# Patient Record
Sex: FEMALE | Race: WHITE | Employment: FULL TIME | URBAN - METROPOLITAN AREA
[De-identification: names, ages, dates, MRNs, and addresses within clinical notes are randomized per-mention and may not be internally consistent; named-entity substitution may affect disease eponyms.]

---

## 2017-01-04 PROBLEM — E66.9 OBESITY (BMI 30-39.9): Status: ACTIVE | Noted: 2017-01-04

## 2017-01-10 PROBLEM — E66.9 OBESITY (BMI 30-39.9): Status: ACTIVE | Noted: 2017-01-10

## 2017-02-01 ENCOUNTER — OFFICE VISIT (OUTPATIENT)
Dept: UROLOGY | Facility: HOSPITAL | Age: 57
End: 2017-02-01
Attending: OBSTETRICS & GYNECOLOGY
Payer: COMMERCIAL

## 2017-02-01 VITALS
BODY MASS INDEX: 30.05 KG/M2 | WEIGHT: 176 LBS | DIASTOLIC BLOOD PRESSURE: 70 MMHG | SYSTOLIC BLOOD PRESSURE: 140 MMHG | HEIGHT: 64 IN

## 2017-02-01 DIAGNOSIS — R39.15 URGENCY OF URINATION: ICD-10-CM

## 2017-02-01 DIAGNOSIS — Z98.890 POST-OPERATIVE STATE: Primary | ICD-10-CM

## 2017-02-01 LAB
BLOOD URINE: NEGATIVE
CONTROL RUN WITHIN 24 HOURS?: YES
NITRITE URINE: NEGATIVE

## 2017-02-01 PROCEDURE — 99211 OFF/OP EST MAY X REQ PHY/QHP: CPT

## 2017-02-01 PROCEDURE — 81002 URINALYSIS NONAUTO W/O SCOPE: CPT

## 2017-02-08 PROBLEM — G47.33 OSA ON CPAP: Status: ACTIVE | Noted: 2017-02-08

## 2017-02-08 PROBLEM — Z99.89 OSA ON CPAP: Status: ACTIVE | Noted: 2017-02-08

## 2017-02-08 PROBLEM — J44.9 CHRONIC OBSTRUCTIVE PULMONARY DISEASE, UNSPECIFIED COPD TYPE (HCC): Status: ACTIVE | Noted: 2017-02-08

## 2017-06-09 PROBLEM — Z95.810 ICD (IMPLANTABLE CARDIOVERTER-DEFIBRILLATOR) IN PLACE: Status: ACTIVE | Noted: 2017-06-09

## 2017-06-28 ENCOUNTER — OFFICE VISIT (OUTPATIENT)
Dept: UROLOGY | Facility: HOSPITAL | Age: 57
End: 2017-06-28
Attending: OBSTETRICS & GYNECOLOGY
Payer: COMMERCIAL

## 2017-06-28 VITALS
BODY MASS INDEX: 27.31 KG/M2 | SYSTOLIC BLOOD PRESSURE: 110 MMHG | WEIGHT: 160 LBS | DIASTOLIC BLOOD PRESSURE: 64 MMHG | HEIGHT: 64 IN

## 2017-06-28 DIAGNOSIS — Z98.890 POST-OPERATIVE STATE: Primary | ICD-10-CM

## 2017-06-28 DIAGNOSIS — R35.1 NOCTURIA: ICD-10-CM

## 2017-06-28 DIAGNOSIS — N39.41 URGE INCONTINENCE: ICD-10-CM

## 2017-06-28 DIAGNOSIS — R39.15 URGENCY OF URINATION: ICD-10-CM

## 2017-06-28 PROCEDURE — 99211 OFF/OP EST MAY X REQ PHY/QHP: CPT

## 2017-06-28 RX ORDER — ESTRADIOL 0.1 MG/G
CREAM VAGINAL
Qty: 1 TUBE | Refills: 3 | Status: SHIPPED | OUTPATIENT
Start: 2017-06-28 | End: 2018-08-13

## 2017-06-28 NOTE — PATIENT INSTRUCTIONS
[facility]    PELVIC MUSCLE EXERCISES [facility])    The muscles that surround the vagina help to support the pelvic organs and maintain bladder and bowel control are called the “pelvic floor muscles”.   Exercises for these musc effort in an active squeeze in order to strengthen the muscles. It is better to maintain a strong active squeeze for a short period of time that to flick the muscle on and off for any period of time.   You will need to work up to a full 10-second squeeze g

## 2017-07-06 PROBLEM — M16.11 PRIMARY OSTEOARTHRITIS OF RIGHT HIP: Status: ACTIVE | Noted: 2017-07-06

## 2017-08-03 PROCEDURE — 86803 HEPATITIS C AB TEST: CPT | Performed by: FAMILY MEDICINE

## 2017-10-18 ENCOUNTER — HOSPITAL ENCOUNTER (OUTPATIENT)
Facility: HOSPITAL | Age: 57
Setting detail: HOSPITAL OUTPATIENT SURGERY
Discharge: HOME | End: 2017-10-18
Attending: INTERNAL MEDICINE | Admitting: INTERNAL MEDICINE
Payer: COMMERCIAL

## 2017-10-18 ENCOUNTER — SURGERY (OUTPATIENT)
Age: 57
End: 2017-10-18

## 2017-10-18 VITALS
RESPIRATION RATE: 16 BRPM | HEIGHT: 64 IN | WEIGHT: 160 LBS | SYSTOLIC BLOOD PRESSURE: 115 MMHG | BODY MASS INDEX: 27.31 KG/M2 | DIASTOLIC BLOOD PRESSURE: 61 MMHG | TEMPERATURE: 98 F | HEART RATE: 52 BPM | OXYGEN SATURATION: 99 %

## 2017-10-18 DIAGNOSIS — Z12.11 SPECIAL SCREENING FOR MALIGNANT NEOPLASMS, COLON: ICD-10-CM

## 2017-10-18 PROCEDURE — 0DJD8ZZ INSPECTION OF LOWER INTESTINAL TRACT, VIA NATURAL OR ARTIFICIAL OPENING ENDOSCOPIC: ICD-10-PCS | Performed by: INTERNAL MEDICINE

## 2017-10-18 RX ORDER — ONDANSETRON 2 MG/ML
4 INJECTION INTRAMUSCULAR; INTRAVENOUS ONCE AS NEEDED
Status: DISCONTINUED | OUTPATIENT
Start: 2017-10-18 | End: 2017-10-18

## 2017-10-18 RX ORDER — MIDAZOLAM HYDROCHLORIDE 1 MG/ML
INJECTION INTRAMUSCULAR; INTRAVENOUS
Status: DISCONTINUED | OUTPATIENT
Start: 2017-10-18 | End: 2017-10-18

## 2017-10-18 RX ORDER — SODIUM CHLORIDE, SODIUM LACTATE, POTASSIUM CHLORIDE, CALCIUM CHLORIDE 600; 310; 30; 20 MG/100ML; MG/100ML; MG/100ML; MG/100ML
INJECTION, SOLUTION INTRAVENOUS CONTINUOUS
Status: DISCONTINUED | OUTPATIENT
Start: 2017-10-18 | End: 2017-10-18

## 2017-10-18 NOTE — H&P
[facility] GASTROENTEROLOGY    REFERRING PHYSICIAN: Dr. Narcisa Paniagua is a 49year old female. CRC screening    See note reviewed from 9/7/17    PROCEDURE: Colonoscopy    Allergies: Levaquin; Aminoglycosides; Ciprofloxacin;  Ot Howard Mike DO;  Location:  [address] MANAGEMENT  7/3/2014: INJECTION, W/WO CONTRAST, DX/THERAPEUTIC SUBST* N/A      Comment: Procedure: LUMBAR EPIDURAL;  Surgeon: Althea Domínguez DO;  Location:

## 2017-10-18 NOTE — OPERATIVE REPORT
[address] [place] [address] Patient Status:  Hospital Outpatient Surgery    11/3/1960 MRN [id number]   [facility] ENDOSCOPY Attending Joel Collado MD   Hosp Day # 0 PCP Mary Ba MD         PATIENT NAME: Lara Cids

## 2017-10-25 ENCOUNTER — OFFICE VISIT (OUTPATIENT)
Dept: OBGYN CLINIC | Facility: CLINIC | Age: 57
End: 2017-10-25

## 2017-10-25 VITALS
DIASTOLIC BLOOD PRESSURE: 60 MMHG | WEIGHT: 165 LBS | BODY MASS INDEX: 29.98 KG/M2 | HEIGHT: 62.25 IN | SYSTOLIC BLOOD PRESSURE: 114 MMHG | HEART RATE: 64 BPM

## 2017-10-25 DIAGNOSIS — Z01.419 ENCOUNTER FOR GYNECOLOGICAL EXAMINATION WITHOUT ABNORMAL FINDING: Primary | ICD-10-CM

## 2017-10-25 DIAGNOSIS — N64.4 BREAST PAIN, LEFT: ICD-10-CM

## 2017-10-25 PROCEDURE — 99396 PREV VISIT EST AGE 40-64: CPT | Performed by: OBSTETRICS & GYNECOLOGY

## 2017-10-25 NOTE — PROGRESS NOTES
Patient ID:   Anthony Aragon  is a 49year old female         HPI:     Here for general gyn exam. Last seen 2016. New medical/surgical hx:  Diagnosed with sleep apnea on testing last year - has CPAP device.  Developed severe hip pain af [facility])    • Depression    • Extrinsic asthma, unspecified     moderate persistent   • Fibrocystic breast disease    • Fibroids    • H/O mammogram 16    benign   • H/O pregnancy 1984       • High blood pressure    • High cholesterol    • Surgeon: Belen Lundberg DO;  Location: [address] [place] [address]                MANAGEMENT  2009: MARY BIOPSY STEREOTACTIC NODULE 2 SITE BILAT      Comment: gera  11-: SPINAL FUSION  No date: TONSILLECTOMY      Comment: child  6.25.2008: X rhythm, normal heart sounds. Pulmonary/Chest: Clear to auscultation. Breath sounds normal.  Breasts:   Symmetrical.  Soft tissue with homogeneous texture.   Diffuse tenderness to palpation of almost all of L breast. Slight thickening inferiorly where breas

## 2017-12-11 ENCOUNTER — OFFICE VISIT (OUTPATIENT)
Dept: UROLOGY | Facility: HOSPITAL | Age: 57
End: 2017-12-11
Attending: OBSTETRICS & GYNECOLOGY
Payer: COMMERCIAL

## 2017-12-11 VITALS
HEIGHT: 62.25 IN | SYSTOLIC BLOOD PRESSURE: 106 MMHG | DIASTOLIC BLOOD PRESSURE: 58 MMHG | WEIGHT: 170 LBS | BODY MASS INDEX: 30.89 KG/M2

## 2017-12-11 DIAGNOSIS — Z98.890 POST-OPERATIVE STATE: Primary | ICD-10-CM

## 2017-12-11 PROCEDURE — 99211 OFF/OP EST MAY X REQ PHY/QHP: CPT

## 2017-12-11 RX ORDER — OXYBUTYNIN CHLORIDE 10 MG/1
10 TABLET, EXTENDED RELEASE ORAL DAILY
Qty: 30 TABLET | Refills: 3 | Status: SHIPPED | OUTPATIENT
Start: 2017-12-11 | End: 2017-12-20

## 2017-12-18 ENCOUNTER — HOSPITAL ENCOUNTER (OUTPATIENT)
Dept: MAMMOGRAPHY | Facility: HOSPITAL | Age: 57
Discharge: HOME | End: 2017-12-18
Attending: OBSTETRICS & GYNECOLOGY
Payer: COMMERCIAL

## 2017-12-18 DIAGNOSIS — N64.4 BREAST PAIN, LEFT: ICD-10-CM

## 2017-12-18 PROCEDURE — 77062 BREAST TOMOSYNTHESIS BI: CPT | Performed by: OBSTETRICS & GYNECOLOGY

## 2017-12-18 PROCEDURE — 77066 DX MAMMO INCL CAD BI: CPT | Performed by: OBSTETRICS & GYNECOLOGY

## 2017-12-20 ENCOUNTER — TELEPHONE (OUTPATIENT)
Dept: UROLOGY | Facility: HOSPITAL | Age: 57
End: 2017-12-20

## 2017-12-20 NOTE — TELEPHONE ENCOUNTER
Pt called with c/o dizziness and HA while taking Ditropan, she stopped the medication over the weekend and is feeling better, she does not want to take it anymore, asking if there is another medication she can try, VORB per Dr. Arely Bai, Myrbbetriq 50 mg one t

## 2018-01-02 ENCOUNTER — TELEPHONE (OUTPATIENT)
Dept: UROLOGY | Facility: HOSPITAL | Age: 58
End: 2018-01-02

## 2018-01-02 NOTE — TELEPHONE ENCOUNTER
Pt called with bp report as requested by our office after starting Myrbetriq 2 weeks ago. /68 today. Pt reports her nocturia is improving on Myrbetriq but feels it is too early to tell.   Encouraged to continue checking BPs at least weekly and repor

## 2018-01-31 ENCOUNTER — TELEPHONE (OUTPATIENT)
Dept: UROLOGY | Facility: HOSPITAL | Age: 58
End: 2018-01-31

## 2018-01-31 NOTE — TELEPHONE ENCOUNTER
Pt phoned w/ medication update. Is currently taking mybetriq. States her b/p is fine. States she no longer has the bedwetting and is getting up once a night to empty. Pt feels she is \"at a standstill\".  Has urgency problems in the morning, but knows ricardo

## 2018-02-10 PROCEDURE — 87077 CULTURE AEROBIC IDENTIFY: CPT | Performed by: FAMILY MEDICINE

## 2018-02-10 PROCEDURE — 87186 SC STD MICRODIL/AGAR DIL: CPT | Performed by: FAMILY MEDICINE

## 2018-02-10 PROCEDURE — 87086 URINE CULTURE/COLONY COUNT: CPT | Performed by: FAMILY MEDICINE

## 2018-02-19 ENCOUNTER — TELEPHONE (OUTPATIENT)
Dept: UROLOGY | Facility: HOSPITAL | Age: 58
End: 2018-02-19

## 2018-02-19 NOTE — TELEPHONE ENCOUNTER
Patient is calling with OAB update - taking Myrbetriq 50 mg daily and had seen improvement in nocturia until last week when she had an episode of urge incontinence at [facility].  Is currently being treated for a UTI - Is using Estrace vaginal cream, but not using

## 2018-03-01 ENCOUNTER — HOSPITAL ENCOUNTER (OUTPATIENT)
Dept: CARDIOLOGY CLINIC | Facility: HOSPITAL | Age: 58
Discharge: HOME | End: 2018-03-01
Attending: INTERNAL MEDICINE

## 2018-03-01 DIAGNOSIS — Z13.9 ENCOUNTER FOR SCREENING: ICD-10-CM

## 2018-04-13 ENCOUNTER — OFFICE VISIT (OUTPATIENT)
Dept: UROLOGY | Facility: HOSPITAL | Age: 58
End: 2018-04-13
Attending: OBSTETRICS & GYNECOLOGY
Payer: COMMERCIAL

## 2018-04-13 VITALS
HEIGHT: 62 IN | BODY MASS INDEX: 32.02 KG/M2 | WEIGHT: 174 LBS | SYSTOLIC BLOOD PRESSURE: 112 MMHG | DIASTOLIC BLOOD PRESSURE: 62 MMHG

## 2018-04-13 DIAGNOSIS — N39.41 URGE INCONTINENCE: ICD-10-CM

## 2018-04-13 DIAGNOSIS — N39.44 BED WETTING: ICD-10-CM

## 2018-04-13 DIAGNOSIS — R39.15 URGENCY OF URINATION: Primary | ICD-10-CM

## 2018-04-13 DIAGNOSIS — R35.1 NOCTURIA: ICD-10-CM

## 2018-04-13 PROCEDURE — 99211 OFF/OP EST MAY X REQ PHY/QHP: CPT

## 2018-08-13 ENCOUNTER — TELEPHONE (OUTPATIENT)
Dept: UROLOGY | Facility: HOSPITAL | Age: 58
End: 2018-08-13

## 2018-08-13 RX ORDER — MIRABEGRON 50 MG/1
TABLET, FILM COATED, EXTENDED RELEASE ORAL
Qty: 30 TABLET | Refills: 5 | Status: SHIPPED | OUTPATIENT
Start: 2018-08-13 | End: 2019-02-19

## 2018-08-13 RX ORDER — ESTRADIOL 0.1 MG/G
CREAM VAGINAL
Qty: 43 G | Refills: 2 | Status: SHIPPED | OUTPATIENT
Start: 2018-08-13 | End: 2019-03-19

## 2018-08-13 NOTE — TELEPHONE ENCOUNTER
Pt called, states Mybetriq is helping and wanted to know if she could get a refill, also needs a refill on her Estrace cream, pt had an apt in April, [address] [facility] to refill both meds until April 2019 apt, Estrace cream one gram per vagina twice weekly, Romel

## 2018-12-10 ENCOUNTER — OFFICE VISIT (OUTPATIENT)
Dept: HEMATOLOGY/ONCOLOGY | Facility: HOSPITAL | Age: 58
End: 2018-12-10
Attending: THORACIC SURGERY (CARDIOTHORACIC VASCULAR SURGERY)
Payer: COMMERCIAL

## 2018-12-10 VITALS
OXYGEN SATURATION: 98 % | TEMPERATURE: 98 F | DIASTOLIC BLOOD PRESSURE: 62 MMHG | WEIGHT: 148.38 LBS | BODY MASS INDEX: 25.33 KG/M2 | SYSTOLIC BLOOD PRESSURE: 116 MMHG | HEART RATE: 60 BPM | RESPIRATION RATE: 18 BRPM | HEIGHT: 64.02 IN

## 2018-12-10 DIAGNOSIS — R91.1 LUNG NODULE: Primary | ICD-10-CM

## 2018-12-10 PROCEDURE — 99211 OFF/OP EST MAY X REQ PHY/QHP: CPT

## 2018-12-10 NOTE — CONSULTS
Thoracic Surgery H and P    Reason for Consultation: RUL lung nodule    Consulting Physician: Meena DOLL    Chief Complaint: \"lung spot\"    History of Present Illness: Patient is a 66year old, female with PMHx of COPD, asthma, HTN, and cardiomyopathy tablet, Rfl: 0  •  ATORVASTATIN 10 MG Oral Tab, TAKE 1 TABLET BY MOUTH IN THE EVENING *COMPLETE FASTING LABS SOON FOR FURTHER REFILLS, Disp: 90 tablet, Rfl: 3  •  Tiotropium Bromide Monohydrate (SPIRIVA RESPIMAT) 2.5 MCG/ACT Inhalation Aero Soln, Inhale 2 screening 6/2011    wnl   • Seizure [facility]) 2004    one seizure that was heart related - defibritaltor put in    • Sleep apnea     CPAP   • Unspecified essential hypertension    • Visual impairment     contacts/glasses       Past Surgical History:    Past Mitchell History:    Family History   Adopted: Yes   Problem Relation Age of Onset   • Allergies Daughter    • Asthma Daughter    • Asthma Son          Review of Systems:    A 10 point review of systems was conducted and was negative except that which was  listed i esophagus  4. Coronary arterial calcifications  5. Mild emphysematous changes    PET/CT 11/16/18: IMPRESSION:  Low level uptake in the groundglass attenuation right upper lobe nodule.  While this could be  inflammatory in nature, an indolent malignancy suc Once this is complete, I will look to her cardiologist for operative clearance.   I went over the alternatives (surveillance, needle biopsy with possible radiation) and the risks, including: bleeding, infection, prolonged air leak, nerve injury, MI, stroke,

## 2018-12-12 ENCOUNTER — APPOINTMENT (OUTPATIENT)
Dept: HEMATOLOGY/ONCOLOGY | Facility: HOSPITAL | Age: 58
End: 2018-12-12
Attending: THORACIC SURGERY (CARDIOTHORACIC VASCULAR SURGERY)
Payer: COMMERCIAL

## 2018-12-14 ENCOUNTER — APPOINTMENT (OUTPATIENT)
Dept: HEMATOLOGY/ONCOLOGY | Facility: HOSPITAL | Age: 58
End: 2018-12-14
Payer: COMMERCIAL

## 2018-12-17 ENCOUNTER — APPOINTMENT (OUTPATIENT)
Dept: LAB | Facility: HOSPITAL | Age: 58
End: 2018-12-17
Attending: PHYSICIAN ASSISTANT
Payer: COMMERCIAL

## 2018-12-17 ENCOUNTER — LAB ENCOUNTER (OUTPATIENT)
Dept: LAB | Age: 58
End: 2018-12-17
Attending: PHYSICIAN ASSISTANT
Payer: COMMERCIAL

## 2018-12-17 DIAGNOSIS — R91.1 LUNG NODULE: ICD-10-CM

## 2018-12-17 PROCEDURE — 80048 BASIC METABOLIC PNL TOTAL CA: CPT

## 2018-12-17 PROCEDURE — 86900 BLOOD TYPING SEROLOGIC ABO: CPT

## 2018-12-17 PROCEDURE — 93005 ELECTROCARDIOGRAM TRACING: CPT

## 2018-12-17 PROCEDURE — 86901 BLOOD TYPING SEROLOGIC RH(D): CPT

## 2018-12-17 PROCEDURE — 86850 RBC ANTIBODY SCREEN: CPT

## 2018-12-17 PROCEDURE — 36415 COLL VENOUS BLD VENIPUNCTURE: CPT

## 2018-12-17 PROCEDURE — 93010 ELECTROCARDIOGRAM REPORT: CPT | Performed by: INTERNAL MEDICINE

## 2018-12-17 PROCEDURE — 85025 COMPLETE CBC W/AUTO DIFF WBC: CPT

## 2019-01-17 ENCOUNTER — ANESTHESIA (OUTPATIENT)
Dept: CARDIAC SURGERY | Facility: HOSPITAL | Age: 59
End: 2019-01-17

## 2019-01-17 ENCOUNTER — HOSPITAL ENCOUNTER (INPATIENT)
Facility: HOSPITAL | Age: 59
LOS: 2 days | Discharge: HOME | DRG: 164 | End: 2019-01-19
Attending: THORACIC SURGERY (CARDIOTHORACIC VASCULAR SURGERY) | Admitting: THORACIC SURGERY (CARDIOTHORACIC VASCULAR SURGERY)
Payer: COMMERCIAL

## 2019-01-17 ENCOUNTER — ANESTHESIA EVENT (OUTPATIENT)
Dept: CARDIAC SURGERY | Facility: HOSPITAL | Age: 59
End: 2019-01-17

## 2019-01-17 LAB
ANTIBODY SCREEN: NEGATIVE
GLUCOSE BLD-MCNC: 100 MG/DL (ref 70–99)
ISTAT ACTIVATED CLOTTING TIME: 136 SECONDS (ref 74–137)
ISTAT BLOOD GAS BASE DEFICIT: -1 MMOL/L
ISTAT BLOOD GAS HCO3: 24.4 MEQ/L (ref 22–26)
ISTAT BLOOD GAS O2 SATURATION: 100 % (ref 92–100)
ISTAT BLOOD GAS PCO2: 40.1 MMHG (ref 35–45)
ISTAT BLOOD GAS PH: 7.39 (ref 7.35–7.45)
ISTAT BLOOD GAS PO2: 291 MMHG (ref 80–105)
ISTAT BLOOD GAS TCO2: 26 MMOL/L (ref 22–32)
ISTAT HEMATOCRIT: 37 % (ref 34–50)
ISTAT IONIZED CALCIUM: 1.13 MMOL/L (ref 1.12–1.32)
ISTAT POTASSIUM: 3.5 MMOL/L (ref 3.6–5.1)
ISTAT SODIUM: 143 MMOL/L (ref 136–144)
RH BLOOD TYPE: POSITIVE

## 2019-01-17 PROCEDURE — 86920 COMPATIBILITY TEST SPIN: CPT

## 2019-01-17 PROCEDURE — 88321 CONSLTJ&REPRT SLD PREP ELSWR: CPT | Performed by: THORACIC SURGERY (CARDIOTHORACIC VASCULAR SURGERY)

## 2019-01-17 PROCEDURE — 0BBC4ZZ EXCISION OF RIGHT UPPER LUNG LOBE, PERCUTANEOUS ENDOSCOPIC APPROACH: ICD-10-PCS | Performed by: THORACIC SURGERY (CARDIOTHORACIC VASCULAR SURGERY)

## 2019-01-17 PROCEDURE — 88307 TISSUE EXAM BY PATHOLOGIST: CPT | Performed by: THORACIC SURGERY (CARDIOTHORACIC VASCULAR SURGERY)

## 2019-01-17 PROCEDURE — 84295 ASSAY OF SERUM SODIUM: CPT

## 2019-01-17 PROCEDURE — 85014 HEMATOCRIT: CPT

## 2019-01-17 PROCEDURE — 86850 RBC ANTIBODY SCREEN: CPT | Performed by: THORACIC SURGERY (CARDIOTHORACIC VASCULAR SURGERY)

## 2019-01-17 PROCEDURE — 84132 ASSAY OF SERUM POTASSIUM: CPT

## 2019-01-17 PROCEDURE — 4B02XTZ MEASUREMENT OF CARDIAC DEFIBRILLATOR, EXTERNAL APPROACH: ICD-10-PCS | Performed by: HOSPITALIST

## 2019-01-17 PROCEDURE — 86900 BLOOD TYPING SEROLOGIC ABO: CPT | Performed by: THORACIC SURGERY (CARDIOTHORACIC VASCULAR SURGERY)

## 2019-01-17 PROCEDURE — 88331 PATH CONSLTJ SURG 1 BLK 1SPC: CPT | Performed by: THORACIC SURGERY (CARDIOTHORACIC VASCULAR SURGERY)

## 2019-01-17 PROCEDURE — 94660 CPAP INITIATION&MGMT: CPT

## 2019-01-17 PROCEDURE — 07B74ZZ EXCISION OF THORAX LYMPHATIC, PERCUTANEOUS ENDOSCOPIC APPROACH: ICD-10-PCS | Performed by: THORACIC SURGERY (CARDIOTHORACIC VASCULAR SURGERY)

## 2019-01-17 PROCEDURE — 82803 BLOOD GASES ANY COMBINATION: CPT

## 2019-01-17 PROCEDURE — 82330 ASSAY OF CALCIUM: CPT

## 2019-01-17 PROCEDURE — 86901 BLOOD TYPING SEROLOGIC RH(D): CPT | Performed by: THORACIC SURGERY (CARDIOTHORACIC VASCULAR SURGERY)

## 2019-01-17 PROCEDURE — 88342 IMHCHEM/IMCYTCHM 1ST ANTB: CPT

## 2019-01-17 PROCEDURE — 88305 TISSUE EXAM BY PATHOLOGIST: CPT | Performed by: THORACIC SURGERY (CARDIOTHORACIC VASCULAR SURGERY)

## 2019-01-17 PROCEDURE — 88341 IMHCHEM/IMCYTCHM EA ADD ANTB: CPT

## 2019-01-17 PROCEDURE — 88313 SPECIAL STAINS GROUP 2: CPT

## 2019-01-17 PROCEDURE — 85347 COAGULATION TIME ACTIVATED: CPT

## 2019-01-17 RX ORDER — IPRATROPIUM BROMIDE AND ALBUTEROL SULFATE 2.5; .5 MG/3ML; MG/3ML
3 SOLUTION RESPIRATORY (INHALATION) EVERY 6 HOURS PRN
Status: DISCONTINUED | OUTPATIENT
Start: 2019-01-17 | End: 2019-01-19

## 2019-01-17 RX ORDER — ALBUTEROL SULFATE 2.5 MG/3ML
2.5 SOLUTION RESPIRATORY (INHALATION) AS NEEDED
Status: DISCONTINUED | OUTPATIENT
Start: 2019-01-17 | End: 2019-01-17 | Stop reason: HOSPADM

## 2019-01-17 RX ORDER — ALBUTEROL SULFATE 2.5 MG/3ML
SOLUTION RESPIRATORY (INHALATION)
Status: COMPLETED
Start: 2019-01-17 | End: 2019-01-17

## 2019-01-17 RX ORDER — DOCUSATE SODIUM 100 MG/1
100 CAPSULE, LIQUID FILLED ORAL 2 TIMES DAILY
Status: DISCONTINUED | OUTPATIENT
Start: 2019-01-17 | End: 2019-01-19

## 2019-01-17 RX ORDER — MORPHINE SULFATE 2 MG/ML
6 INJECTION, SOLUTION INTRAMUSCULAR; INTRAVENOUS EVERY 4 HOURS PRN
Status: DISCONTINUED | OUTPATIENT
Start: 2019-01-17 | End: 2019-01-19

## 2019-01-17 RX ORDER — KETOROLAC TROMETHAMINE 15 MG/ML
15 INJECTION, SOLUTION INTRAMUSCULAR; INTRAVENOUS EVERY 6 HOURS
Status: DISCONTINUED | OUTPATIENT
Start: 2019-01-17 | End: 2019-01-19

## 2019-01-17 RX ORDER — HYDROMORPHONE HYDROCHLORIDE 1 MG/ML
0.4 INJECTION, SOLUTION INTRAMUSCULAR; INTRAVENOUS; SUBCUTANEOUS EVERY 5 MIN PRN
Status: DISCONTINUED | OUTPATIENT
Start: 2019-01-17 | End: 2019-01-17 | Stop reason: HOSPADM

## 2019-01-17 RX ORDER — RAMIPRIL 5 MG/1
5 CAPSULE ORAL NIGHTLY
Status: DISCONTINUED | OUTPATIENT
Start: 2019-01-17 | End: 2019-01-19

## 2019-01-17 RX ORDER — SODIUM CHLORIDE, SODIUM LACTATE, POTASSIUM CHLORIDE, CALCIUM CHLORIDE 600; 310; 30; 20 MG/100ML; MG/100ML; MG/100ML; MG/100ML
INJECTION, SOLUTION INTRAVENOUS CONTINUOUS
Status: DISCONTINUED | OUTPATIENT
Start: 2019-01-17 | End: 2019-01-17 | Stop reason: HOSPADM

## 2019-01-17 RX ORDER — ATORVASTATIN CALCIUM 10 MG/1
10 TABLET, FILM COATED ORAL NIGHTLY
Status: DISCONTINUED | OUTPATIENT
Start: 2019-01-17 | End: 2019-01-19

## 2019-01-17 RX ORDER — SODIUM CHLORIDE 0.9 % (FLUSH) 0.9 %
10 SYRINGE (ML) INJECTION AS NEEDED
Status: DISCONTINUED | OUTPATIENT
Start: 2019-01-17 | End: 2019-01-19

## 2019-01-17 RX ORDER — METOCLOPRAMIDE HYDROCHLORIDE 5 MG/ML
10 INJECTION INTRAMUSCULAR; INTRAVENOUS AS NEEDED
Status: DISCONTINUED | OUTPATIENT
Start: 2019-01-17 | End: 2019-01-17 | Stop reason: HOSPADM

## 2019-01-17 RX ORDER — HYDROMORPHONE HYDROCHLORIDE 1 MG/ML
INJECTION, SOLUTION INTRAMUSCULAR; INTRAVENOUS; SUBCUTANEOUS
Status: COMPLETED
Start: 2019-01-17 | End: 2019-01-17

## 2019-01-17 RX ORDER — BISACODYL 10 MG
10 SUPPOSITORY, RECTAL RECTAL
Status: DISCONTINUED | OUTPATIENT
Start: 2019-01-17 | End: 2019-01-19

## 2019-01-17 RX ORDER — MONTELUKAST SODIUM 10 MG/1
10 TABLET ORAL NIGHTLY
Status: DISCONTINUED | OUTPATIENT
Start: 2019-01-17 | End: 2019-01-19

## 2019-01-17 RX ORDER — CEFAZOLIN SODIUM/WATER 2 G/20 ML
2 SYRINGE (ML) INTRAVENOUS EVERY 8 HOURS
Status: COMPLETED | OUTPATIENT
Start: 2019-01-17 | End: 2019-01-18

## 2019-01-17 RX ORDER — ACETAMINOPHEN 10 MG/ML
1000 INJECTION, SOLUTION INTRAVENOUS EVERY 6 HOURS
Status: COMPLETED | OUTPATIENT
Start: 2019-01-17 | End: 2019-01-19

## 2019-01-17 RX ORDER — ONDANSETRON 2 MG/ML
4 INJECTION INTRAMUSCULAR; INTRAVENOUS AS NEEDED
Status: DISCONTINUED | OUTPATIENT
Start: 2019-01-17 | End: 2019-01-17 | Stop reason: HOSPADM

## 2019-01-17 RX ORDER — ONDANSETRON 2 MG/ML
4 INJECTION INTRAMUSCULAR; INTRAVENOUS EVERY 6 HOURS PRN
Status: DISCONTINUED | OUTPATIENT
Start: 2019-01-17 | End: 2019-01-19

## 2019-01-17 RX ORDER — OXYCODONE HYDROCHLORIDE 5 MG/1
5 TABLET ORAL EVERY 4 HOURS PRN
Qty: 40 TABLET | Refills: 0 | Status: SHIPPED | OUTPATIENT
Start: 2019-01-17 | End: 2019-01-25 | Stop reason: SINTOL

## 2019-01-17 RX ORDER — BUPIVACAINE HYDROCHLORIDE 2.5 MG/ML
INJECTION, SOLUTION EPIDURAL; INFILTRATION; INTRACAUDAL AS NEEDED
Status: DISCONTINUED | OUTPATIENT
Start: 2019-01-17 | End: 2019-01-17

## 2019-01-17 RX ORDER — CALCIUM CARBONATE 200(500)MG
1000 TABLET,CHEWABLE ORAL 3 TIMES DAILY PRN
Status: DISCONTINUED | OUTPATIENT
Start: 2019-01-17 | End: 2019-01-19

## 2019-01-17 RX ORDER — ONDANSETRON 2 MG/ML
INJECTION INTRAMUSCULAR; INTRAVENOUS
Status: COMPLETED
Start: 2019-01-17 | End: 2019-01-17

## 2019-01-17 RX ORDER — ESCITALOPRAM OXALATE 10 MG/1
10 TABLET ORAL NIGHTLY
Status: DISCONTINUED | OUTPATIENT
Start: 2019-01-17 | End: 2019-01-19

## 2019-01-17 RX ORDER — KETOROLAC TROMETHAMINE 15 MG/ML
30 INJECTION, SOLUTION INTRAMUSCULAR; INTRAVENOUS ONCE
Status: DISCONTINUED | OUTPATIENT
Start: 2019-01-17 | End: 2019-01-19

## 2019-01-17 RX ORDER — MEPERIDINE HYDROCHLORIDE 25 MG/ML
12.5 INJECTION INTRAMUSCULAR; INTRAVENOUS; SUBCUTANEOUS AS NEEDED
Status: DISCONTINUED | OUTPATIENT
Start: 2019-01-17 | End: 2019-01-17 | Stop reason: HOSPADM

## 2019-01-17 RX ORDER — METOPROLOL SUCCINATE 100 MG/1
200 TABLET, EXTENDED RELEASE ORAL NIGHTLY
Status: DISCONTINUED | OUTPATIENT
Start: 2019-01-17 | End: 2019-01-19

## 2019-01-17 RX ORDER — NALOXONE HYDROCHLORIDE 0.4 MG/ML
80 INJECTION, SOLUTION INTRAMUSCULAR; INTRAVENOUS; SUBCUTANEOUS AS NEEDED
Status: DISCONTINUED | OUTPATIENT
Start: 2019-01-17 | End: 2019-01-17 | Stop reason: HOSPADM

## 2019-01-17 RX ORDER — BUPROPION HYDROCHLORIDE 150 MG/1
150 TABLET, EXTENDED RELEASE ORAL 2 TIMES DAILY
Status: DISCONTINUED | OUTPATIENT
Start: 2019-01-17 | End: 2019-01-19

## 2019-01-17 RX ORDER — POLYETHYLENE GLYCOL 3350 17 G/17G
17 POWDER, FOR SOLUTION ORAL DAILY PRN
Status: DISCONTINUED | OUTPATIENT
Start: 2019-01-17 | End: 2019-01-19

## 2019-01-17 RX ORDER — SODIUM CHLORIDE 9 MG/ML
INJECTION, SOLUTION INTRAVENOUS CONTINUOUS
Status: DISCONTINUED | OUTPATIENT
Start: 2019-01-17 | End: 2019-01-18

## 2019-01-17 RX ORDER — MORPHINE SULFATE 2 MG/ML
4 INJECTION, SOLUTION INTRAMUSCULAR; INTRAVENOUS EVERY 4 HOURS PRN
Status: DISCONTINUED | OUTPATIENT
Start: 2019-01-17 | End: 2019-01-19

## 2019-01-17 RX ORDER — MORPHINE SULFATE 2 MG/ML
2 INJECTION, SOLUTION INTRAMUSCULAR; INTRAVENOUS EVERY 4 HOURS PRN
Status: DISCONTINUED | OUTPATIENT
Start: 2019-01-17 | End: 2019-01-19

## 2019-01-17 RX ORDER — CEFAZOLIN SODIUM/WATER 2 G/20 ML
SYRINGE (ML) INTRAVENOUS
Status: DISCONTINUED | OUTPATIENT
Start: 2019-01-17 | End: 2019-01-17

## 2019-01-17 RX ORDER — HEPARIN SODIUM 5000 [USP'U]/ML
5000 INJECTION, SOLUTION INTRAVENOUS; SUBCUTANEOUS EVERY 8 HOURS SCHEDULED
Status: DISCONTINUED | OUTPATIENT
Start: 2019-01-17 | End: 2019-01-19

## 2019-01-17 RX ORDER — OXYCODONE HYDROCHLORIDE 5 MG/1
5 TABLET ORAL EVERY 4 HOURS PRN
Status: DISCONTINUED | OUTPATIENT
Start: 2019-01-17 | End: 2019-01-19

## 2019-01-17 RX ORDER — SODIUM PHOSPHATE, DIBASIC AND SODIUM PHOSPHATE, MONOBASIC 7; 19 G/133ML; G/133ML
1 ENEMA RECTAL ONCE AS NEEDED
Status: DISCONTINUED | OUTPATIENT
Start: 2019-01-17 | End: 2019-01-19

## 2019-01-17 RX ORDER — OXYCODONE HYDROCHLORIDE 10 MG/1
10 TABLET ORAL EVERY 4 HOURS PRN
Status: DISCONTINUED | OUTPATIENT
Start: 2019-01-17 | End: 2019-01-19

## 2019-01-17 NOTE — ANESTHESIA PREPROCEDURE EVALUATION
PRE-OP EVALUATION    Patient Name: Sherie Silveira    Pre-op Diagnosis: lung nodule    Procedure(s):  flexible bronchoscopy, right video assisted upper lobe wedge resection, lymph node dissection,possible lobectomy, possible thoracotomy  Elisa MATHEW    Surg LABS SOON FOR FURTHER REFILLS Disp: 90 tablet Rfl: 3   Metoprolol Succinate  MG Oral Tablet 24 Hr Take 1 tablet (200 mg total) by mouth once daily.  Disp: 90 tablet Rfl: 3   ALBUTEROL SULFATE (2.5 MG/3ML) 0.083% Inhalation Nebu Soln USE 1 AMPULE IN NE (chronic obstructive pulmonary disease) (HCC) Visual impairment  Osteoarthritis History of suburethral sling procedure  Alcoholic cardiomyopathy ([address] Seizure disorder ([address]    2D Echo  Findings    Left ventricle:  The cavity size is normal. Wall thickness CARDIAC DEFIBRILLATOR PLACEMENT  2011    Medtronic single chamber ICD   • CARDIAC PACEMAKER PLACEMENT     • COLONOSCOPY  10/18/17= Diverticulosis    Repeat 2022   • COLONOSCOPY N/A 10/18/2017    Performed by Thais Lara MD at [address] [place] [address],[address] 12/17/2018    GLU 97 12/17/2018    CA 9.0 12/17/2018            Airway      Mallampati: III  Mouth opening: 3 FB  TM distance: 4 - 6 cm  Neck ROM: full Cardiovascular      Rhythm: regular  Rate: normal     Dental    No notable dental history.          Pulmo

## 2019-01-17 NOTE — BRIEF OP NOTE
Pre-Operative Diagnosis: lung nodule     Post-Operative Diagnosis: lung nodule       Procedure Performed:   Procedure(s):  Flexible bronchoscopy, right video assisted upper lobe wedge resection, lymph node dissection.     Surgeon(s) and Role:     Pino Manley

## 2019-01-17 NOTE — PROGRESS NOTES
Admit this am for thoracic surgery with Dr. Karla Gaspar, family at bedside, all questions answered, no changes in health since last visit. BB called and needs a new t&s. will collect urine studies asap. monitor for s/s.

## 2019-01-17 NOTE — PLAN OF CARE
Assumed care @ 1200. A&Ox4.  1L O2 sats 95%. NSR. Ch draining normal volumes. 6/10 pain controlled with IV morphine at this time. Chest tube at low suction with dressing in tact. Plan of care discussed with patient. Call light within reach.   Will c

## 2019-01-17 NOTE — ANESTHESIA POSTPROCEDURE EVALUATION
[facility] Patient Status:  Inpatient   Age/Gender 66year old female MRN [id number]   Location [facility] Attending Maicol Gutierrez, Maite Vaughn MD   [facility] Day [facility] PCP Blair Mays MD       Anesthesia Post-op

## 2019-01-17 NOTE — H&P
Thoracic Surgery H and P    Reason for Consultation: RUL nodule    Consulting Physician: Meena DOLL    Chief Complaint: \" lung spot.  \"    History of Present Illness: Patient is a 66year old, female 66year old, female with PMHx of COPD, asthma, HTN, depressive disorder 4/3/2013   • Osteoarthritis     right hip bone spurs   • Osteopenia    • Other and unspecified hyperlipidemia    • Pap smear for cervical cancer screening 6/2011    wnl   • Seizure ([address] 2004    one seizure that was heart related - defi quittin.3      Smokeless tobacco: Never Used      Tobacco comment: smokes e cigarette now, quiting/at least 30 pack hx per md note    Alcohol use: No      Alcohol/week: 0.0 oz      Family History:    Family History   Adopted: Yes   Problem Relation Age recommended  2. Additional small nodular densities in both lungs as described  3. Small hiatal hernia and/or wall thickening of the distal esophagus  4. Coronary arterial calcifications  5. Mild emphysematous changes     PET/CT 11/16/18:   IMPRESSION:  Low

## 2019-01-17 NOTE — CONSULTS
General Medicine Consult      Reason for consult: medical management    Consulted by: Dr. Esperanza Reza    PCP: Elisha Richey MD      History of Present Illness: Patient is a 66year old female with mmp including but not limited to cardiomyopathy with ICD, HTN N/A 10/18/2017    Performed by Kori Mueller MD at [facility] ENDOSCOPY   • COLPOSCOPY, CERVIX W/UPPER ADJACENT VAGINA; W/BIOPSY(S), CERVIX  1985   • CRYOCAUTERY OF CERVIX  1985    CECE   • HYSTERECTOMY  6/26/2012    erin S & O   • INTRAOPERATIVE NEURO MONITORING Apply 1 gram vaginally 2-3 times per week.  Disp: 43 g Rfl: 2   [DISCONTINUED] MONTELUKAST SODIUM 10 MG Oral Tab TAKE 1 TABLET BY MOUTH IN THE EVENING  Disp: 30 tablet Rfl: 5   TraMADol HCl 50 MG Oral Tab Take 1 tablet (50 mg total) by mouth every 6 (six) h hayfever, horses, cats-nasal symptoms, itchy             eyes     Soc Hx:  Social History    Tobacco Use      Smoking status: Former Smoker        Packs/day: 1.00        Years: 35.00        Pack years: 35        Types: Cigarettes        Quit date: 9/21/201 pain  -IV morphine prn, oxy prn-encourage transition to oral  -antiemetics, bowel regimen    ** pulmonary nodule s/p RUL wedge resection with LN dissection  -prelim path- benign    **cardiomyopathy with ICD-no acute issues, continue home meds    **HTN-stab

## 2019-01-17 NOTE — CONSULTS
Pulmonary Consult     Assessment / Plan:  1. Pulmonary nodule s/p right VATS wedge resection  - wean O2 as able  - IS  - chest tube management per thoracic surgery  - follow-up final pathology; prelim path with benign findings  2.  INDRA  - on auto-pap 9-16 Unspecified essential hypertension    • Visual impairment     contacts/glasses       Past Surgical History:   Procedure Laterality Date   • BLADDER TRANSVAGINAL TAPING SUSPENSION / URETHROLYSIS N/A 12/9/2016    Performed by Rosie Eli MD at [place] [address]. 90 capsule Rfl: 1 1/16/2019 at 2030   Fluticasone-Umeclidin-Vilant (TRELEGY ELLIPTA) 100-62.5-25 MCG/INH Inhalation Aerosol Powder, Breath Activated Inhale 1 puff into the lungs daily.  Disp: 3 each Rfl: 3 1/16/2019 at 0900   NAPROXEN 500 MG Oral Tab TAKE 1 Oral Tab TAKE 1 TABLET BY MOUTH TWO TIMES A DAY with meals Disp: 60 tablet Rfl: 0   MYRBETRIQ 50 MG Oral Tablet 24 Hr TAKE 1 TABLET BY MOUTH ONE TIME A DAY  Disp: 30 tablet Rfl: 5   Estradiol (ESTRACE) 0.1 MG/GM Vaginal Cream Apply 1 gram vaginally 2-3 vamsi quittin.3      Smokeless tobacco: Never Used      Tobacco comment: smokes e cigarette now, quiting/at least 30 pack hx per md note    Substance and Sexual Activity      Alcohol use: No        Alcohol/week: 0.0 oz      Drug use: No      Sexual activity:

## 2019-01-18 LAB
ANION GAP SERPL CALC-SCNC: 9 MMOL/L (ref 0–18)
BASOPHILS # BLD AUTO: 0.01 X10(3) UL (ref 0–0.1)
BASOPHILS NFR BLD AUTO: 0.1 %
BUN BLD-MCNC: 7 MG/DL (ref 8–20)
BUN/CREAT SERPL: 11.5 (ref 10–20)
CALCIUM BLD-MCNC: 8.2 MG/DL (ref 8.3–10.3)
CHLORIDE SERPL-SCNC: 110 MMOL/L (ref 101–111)
CO2 SERPL-SCNC: 23 MMOL/L (ref 22–32)
CREAT BLD-MCNC: 0.61 MG/DL (ref 0.55–1.02)
EOSINOPHIL # BLD AUTO: 0 X10(3) UL (ref 0–0.3)
EOSINOPHIL NFR BLD AUTO: 0 %
ERYTHROCYTE [DISTWIDTH] IN BLOOD BY AUTOMATED COUNT: 13.8 % (ref 11.5–16)
GLUCOSE BLD-MCNC: 94 MG/DL (ref 70–99)
HAV IGM SER QL: 2.2 MG/DL (ref 1.8–2.5)
HCT VFR BLD AUTO: 34.2 % (ref 34–50)
HGB BLD-MCNC: 11.3 G/DL (ref 12–16)
IMMATURE GRANULOCYTE COUNT: 0.08 X10(3) UL (ref 0–1)
IMMATURE GRANULOCYTE RATIO %: 0.6 %
LYMPHOCYTES # BLD AUTO: 1.56 X10(3) UL (ref 0.9–4)
LYMPHOCYTES NFR BLD AUTO: 11.2 %
MCH RBC QN AUTO: 30.2 PG (ref 27–33.2)
MCHC RBC AUTO-ENTMCNC: 33 G/DL (ref 31–37)
MCV RBC AUTO: 91.4 FL (ref 81–100)
MONOCYTES # BLD AUTO: 1.34 X10(3) UL (ref 0.1–1)
MONOCYTES NFR BLD AUTO: 9.6 %
NEUTROPHIL ABS PRELIM: 10.91 X10 (3) UL (ref 1.3–6.7)
NEUTROPHILS # BLD AUTO: 10.91 X10(3) UL (ref 1.3–6.7)
NEUTROPHILS NFR BLD AUTO: 78.5 %
OSMOLALITY SERPL CALC.SUM OF ELEC: 292 MOSM/KG (ref 275–295)
PLATELET # BLD AUTO: 295 10(3)UL (ref 150–450)
POTASSIUM SERPL-SCNC: 3.9 MMOL/L (ref 3.6–5.1)
RBC # BLD AUTO: 3.74 X10(6)UL (ref 3.8–5.1)
RED CELL DISTRIBUTION WIDTH-SD: 46.1 FL (ref 35.1–46.3)
SODIUM SERPL-SCNC: 142 MMOL/L (ref 136–144)
WBC # BLD AUTO: 13.9 X10(3) UL (ref 4–13)

## 2019-01-18 PROCEDURE — 80048 BASIC METABOLIC PNL TOTAL CA: CPT | Performed by: HOSPITALIST

## 2019-01-18 PROCEDURE — 85025 COMPLETE CBC W/AUTO DIFF WBC: CPT | Performed by: HOSPITALIST

## 2019-01-18 PROCEDURE — 83735 ASSAY OF MAGNESIUM: CPT | Performed by: HOSPITALIST

## 2019-01-18 NOTE — PROGRESS NOTES
THORACIC SURGERY POST-OPERATIVE PROGRESS NOTE    Subjective:    Doing well. Complains only of pain. Objective:     01/18/19  0823   BP: 100/80   Pulse: 76   Resp: 20   Temp: 98.3 °F (36.8 °C)       I/O last 3 completed shifts: In: 2348 [P.O.:720;  I.V.:

## 2019-01-18 NOTE — PLAN OF CARE
Patient alert and oriented times four. Meds given per MAR. Pain managed with scheduled meds. Chest tube with bloody drainage, dressing is c/d/i. Ch with clear, yellow, urine. Vital signs stable. Resting comfortably in bed. Call light in reach.     GASTRO

## 2019-01-18 NOTE — PROGRESS NOTES
[address] [place] [address] Patient Status:  Inpatient    11/3/1960 MRN [id number]   [facility] 7NE-A Attending Patricia Arita., Shy Aguirre MD   Hosp Day # 1 PCP Norvel Lefort, MD     Pulm / Critical Care Progress Note     S: feels ok.  So respiratory distress. Head: Normocephalic, without obvious abnormality, atraumatic. Lungs: ctab   Chest wall: postop changes. Ct on R   Heart: Regular rate and rhythm, normal S1S2, no murmur. Abdomen: soft, non-tender, non-distended, positive BS.    E

## 2019-01-18 NOTE — PROGRESS NOTES
[facility] Hospitalist Progress Note     PCP: Brendan Ann MD    CC:  Follow up    SUBJECTIVE:  Sitting up in bed, family at bedside. Chest tube site sore. No n/v/f/c. Hurts to inhale but no sob currently.   Will walk halls today    OBJECTIVE:  Temp:  [97.5 Tromethamine  15 mg Intravenous Q6H       Normal Saline Flush, PEG 3350, magnesium hydroxide, bisacodyl, FLEET ENEMA, ondansetron HCl, Calcium Carbonate Antacid, morphINE sulfate **OR** morphINE sulfate **OR** morphINE sulfate, oxyCODONE HCl, oxyCODONE HCl

## 2019-01-18 NOTE — PAYOR COMM NOTE
--------------  ADMISSION REVIEW     Payor: [address] [place] [address] EMILY/JESSICA  Subscriber #:  [id number]  Authorization Number: [id number]    Admit date: 1/17/19  Admit time: 6154       Admitting Physician: Jerica Anne MD  Attending Physician:  NICOLE Anne cardiomyopathy ([facility]    • Anxiety state, unspecified    • Asthma    • Back problem     spinal fusion in 2014   • Cardiomyopathy     had defibrillator in 2004 for hx of ARVD, no further episodes since then per pt                  • Cervical dysplasia    • C SPECIMEN 1 SITE LEFT  2011    benign   • POSTERIOR LUMBAR LAMINECT SPINAL FUS W/INSTR 1 LEV N/A 11/18/2014    Performed by Karol Ortiz MD at [facility] MAIN OR   • SI JOINT INJECTION Bilateral 6/5/2014    Performed by Jessica Rizzo DO at [facility]  ascultation bilaterally. Abdomen: Soft, Non-tender, non-distended. No hepatosplenomegaly noted. Extremities: No clubbing or cyanosis.  No lateralizing weakness  Neuro: No gross cranial nerve defects, no loss of sensation  Psych: oriented to person place a Date Action Dose Route User    1/18/2019 0410 Given 1000 mg Intravenous Ada Maharaj RN    1/17/2019 2117 Given 1000 mg Intravenous Ada Maharaj RN    1/17/2019 1731 Given 1000 mg Intravenous Samra Churchill RN      albuterol sulfate (VENTOLIN) Subcutaneous (Left Upper Abdomen) Valerie Vaughn RN    1/17/2019 1729 Given 5000 Units Subcutaneous (Left Lower Abdomen) Niharika Willett RN      HYDROmorphone HCl (DILAUDID) 1 MG/ML injection 0.4 mg     Date Action Dose Route User    1/17/2019 1114 Giv underwent lung cancer screening CT. A lung nodule was found with increased PET uptake and there was concern that it represented a lung cancer.   She was brought to the operating room for diagnostic wedge resection with possible anatomic resection if cancer used a total of 60 mL of 0.25% Marcaine; 5 mL was injected in each interspace 3 through 9 under direct visualization with the thoracoscope for postoperative pain control. I also began doing a mediastinal lymph node dissection.   Frozen section did come tasha Labs   Lab  01/18/19   0535   HGB  11.3*   WBC  13.9*   PLT  295.0             Recent Labs   Lab  01/18/19   0535   NA  142   K  3.9   CL  110   CO2  23.0   BUN  7*         Exam:  General: Awake, alert, NAD  Pulm: CTA B, no w/w/r  Card: RRR, no m/r/g  Abd:

## 2019-01-19 ENCOUNTER — APPOINTMENT (OUTPATIENT)
Dept: GENERAL RADIOLOGY | Facility: HOSPITAL | Age: 59
DRG: 164 | End: 2019-01-19
Attending: INTERNAL MEDICINE
Payer: COMMERCIAL

## 2019-01-19 ENCOUNTER — APPOINTMENT (OUTPATIENT)
Dept: GENERAL RADIOLOGY | Facility: HOSPITAL | Age: 59
DRG: 164 | End: 2019-01-19
Attending: THORACIC SURGERY (CARDIOTHORACIC VASCULAR SURGERY)
Payer: COMMERCIAL

## 2019-01-19 VITALS
HEART RATE: 79 BPM | WEIGHT: 142 LBS | TEMPERATURE: 98 F | SYSTOLIC BLOOD PRESSURE: 119 MMHG | HEIGHT: 64 IN | RESPIRATION RATE: 16 BRPM | OXYGEN SATURATION: 97 % | BODY MASS INDEX: 24.24 KG/M2 | DIASTOLIC BLOOD PRESSURE: 57 MMHG

## 2019-01-19 LAB
ANION GAP SERPL CALC-SCNC: 5 MMOL/L (ref 0–18)
BASOPHILS # BLD AUTO: 0.03 X10(3) UL (ref 0–0.1)
BASOPHILS NFR BLD AUTO: 0.2 %
BUN BLD-MCNC: 10 MG/DL (ref 8–20)
BUN/CREAT SERPL: 13.3 (ref 10–20)
CALCIUM BLD-MCNC: 8.5 MG/DL (ref 8.3–10.3)
CHLORIDE SERPL-SCNC: 111 MMOL/L (ref 101–111)
CO2 SERPL-SCNC: 29 MMOL/L (ref 22–32)
CREAT BLD-MCNC: 0.75 MG/DL (ref 0.55–1.02)
EOSINOPHIL # BLD AUTO: 0.08 X10(3) UL (ref 0–0.3)
EOSINOPHIL NFR BLD AUTO: 0.7 %
ERYTHROCYTE [DISTWIDTH] IN BLOOD BY AUTOMATED COUNT: 14 % (ref 11.5–16)
GLUCOSE BLD-MCNC: 93 MG/DL (ref 70–99)
HAV IGM SER QL: 2.2 MG/DL (ref 1.8–2.5)
HCT VFR BLD AUTO: 34.6 % (ref 34–50)
HGB BLD-MCNC: 10.8 G/DL (ref 12–16)
IMMATURE GRANULOCYTE COUNT: 0.06 X10(3) UL (ref 0–1)
IMMATURE GRANULOCYTE RATIO %: 0.5 %
LYMPHOCYTES # BLD AUTO: 2.66 X10(3) UL (ref 0.9–4)
LYMPHOCYTES NFR BLD AUTO: 22 %
MCH RBC QN AUTO: 29.7 PG (ref 27–33.2)
MCHC RBC AUTO-ENTMCNC: 31.2 G/DL (ref 31–37)
MCV RBC AUTO: 95.1 FL (ref 81–100)
MONOCYTES # BLD AUTO: 1.12 X10(3) UL (ref 0.1–1)
MONOCYTES NFR BLD AUTO: 9.2 %
NEUTROPHIL ABS PRELIM: 8.16 X10 (3) UL (ref 1.3–6.7)
NEUTROPHILS # BLD AUTO: 8.16 X10(3) UL (ref 1.3–6.7)
NEUTROPHILS NFR BLD AUTO: 67.4 %
OSMOLALITY SERPL CALC.SUM OF ELEC: 299 MOSM/KG (ref 275–295)
PLATELET # BLD AUTO: 302 10(3)UL (ref 150–450)
POTASSIUM SERPL-SCNC: 4 MMOL/L (ref 3.6–5.1)
RBC # BLD AUTO: 3.64 X10(6)UL (ref 3.8–5.1)
RED CELL DISTRIBUTION WIDTH-SD: 49 FL (ref 35.1–46.3)
SODIUM SERPL-SCNC: 145 MMOL/L (ref 136–144)
WBC # BLD AUTO: 12.1 X10(3) UL (ref 4–13)

## 2019-01-19 PROCEDURE — 94664 DEMO&/EVAL PT USE INHALER: CPT

## 2019-01-19 PROCEDURE — 83735 ASSAY OF MAGNESIUM: CPT | Performed by: INTERNAL MEDICINE

## 2019-01-19 PROCEDURE — 71045 X-RAY EXAM CHEST 1 VIEW: CPT | Performed by: THORACIC SURGERY (CARDIOTHORACIC VASCULAR SURGERY)

## 2019-01-19 PROCEDURE — 71045 X-RAY EXAM CHEST 1 VIEW: CPT | Performed by: INTERNAL MEDICINE

## 2019-01-19 PROCEDURE — 85025 COMPLETE CBC W/AUTO DIFF WBC: CPT | Performed by: HOSPITALIST

## 2019-01-19 PROCEDURE — 80048 BASIC METABOLIC PNL TOTAL CA: CPT | Performed by: INTERNAL MEDICINE

## 2019-01-19 RX ORDER — PSEUDOEPHEDRINE HCL 30 MG
100 TABLET ORAL 2 TIMES DAILY PRN
Qty: 30 CAPSULE | Refills: 0 | Status: SHIPPED | OUTPATIENT
Start: 2019-01-19 | End: 2019-06-28

## 2019-01-19 NOTE — PLAN OF CARE
NURSING DISCHARGE NOTE    Discharged Home via Ambulatory. Accompanied by Friend  Belongings Taken by patient/family. IV removed. Discharge instructions and follow up appt discussed, all questions answered.

## 2019-01-19 NOTE — PROGRESS NOTES
[facility] Hospitalist Progress Note     PCP: Phoebe Becker MD    CC:  Follow up    SUBJECTIVE:  Sitting up in bed,  at bedside. Pain controlled.  Hoping to have Chest tube removed soon so can go home    OBJECTIVE:  Temp:  [97.3 °F (36.3 °C)-98.2 °F (3 Tromethamine  15 mg Intravenous Q6H       Normal Saline Flush, PEG 3350, magnesium hydroxide, bisacodyl, FLEET ENEMA, ondansetron HCl, Calcium Carbonate Antacid, morphINE sulfate **OR** morphINE sulfate **OR** morphINE sulfate, oxyCODONE HCl, oxyCODONE HCl

## 2019-01-19 NOTE — PLAN OF CARE
Assumed care of pt 1930. Aox4. POD #2 RUL wedge resection. CT to waterseal. No air leak or sub-q emphysema noted. RA, sat's 96%. Pt refusing CPAP while sleeping despite education. Respirations easy and unlabored. Denies SOB. Encouraged IS.  Scheduled torado

## 2019-01-19 NOTE — PROGRESS NOTES
THORACIC SURGERY PROGRESS NOTE  Saige Jeter is a 66year old female. MRN [id number]. Admitted 2019    [address] [place] [address] EVENTS:     No acute events overnight.  Wants to go home    VITALS:     Temp (24hrs), Av °F (36.7 °C), Min:97.3 °F (36.3 °C), GLU 93 01/19/2019    CA 8.5 01/19/2019    MG 2.2 01/19/2019       ASSESSMENT / PLAN:   66year old female POD 2 s/p R VATS/wedge.   Doing well  - D/c chest tube today  - D/c home today        Ly Loera, 01/19/19, 2:09 PM  [phone]

## 2019-01-19 NOTE — PLAN OF CARE
Pt is alert and oriented x4, NSR, denies chest pain and SOB. CT removed, pt cleared for dc pending results of CXR.    GASTROINTESTINAL - ADULT    • Minimal or absence of nausea and vomiting Adequate for Discharge        RESPIRATORY - ADULT    • Achieves opt

## 2019-01-19 NOTE — PROGRESS NOTES
[address] [place] [address] Patient Status:  Inpatient    11/3/1960 MRN [id number]   Location [facility] 7NE-A Attending Nidhi Masters., Callie Pascal MD   Hosp Day # 2 PCP Charu Douglas MD     Pulm / Critical Care Progress Note     S: Pt states sh respiratory distress. Head: Normocephalic, without obvious abnormality, atraumatic. Lungs: ctab   Chest wall: No tenderness or deformity. Heart: Regular rate and rhythm, normal S1S2, no murmur. Abdomen: soft, non-tender, non-distended, positive BS.

## 2019-01-20 LAB — BLOOD TYPE BARCODE: 5100

## 2019-01-23 ENCOUNTER — APPOINTMENT (OUTPATIENT)
Dept: HEMATOLOGY/ONCOLOGY | Facility: HOSPITAL | Age: 59
End: 2019-01-23
Attending: THORACIC SURGERY (CARDIOTHORACIC VASCULAR SURGERY)
Payer: COMMERCIAL

## 2019-01-25 ENCOUNTER — OFFICE VISIT (OUTPATIENT)
Dept: HEMATOLOGY/ONCOLOGY | Facility: HOSPITAL | Age: 59
End: 2019-01-25
Attending: THORACIC SURGERY (CARDIOTHORACIC VASCULAR SURGERY)
Payer: COMMERCIAL

## 2019-01-25 VITALS
SYSTOLIC BLOOD PRESSURE: 115 MMHG | OXYGEN SATURATION: 96 % | DIASTOLIC BLOOD PRESSURE: 65 MMHG | TEMPERATURE: 98 F | HEIGHT: 64.02 IN | RESPIRATION RATE: 18 BRPM | BODY MASS INDEX: 24.41 KG/M2 | HEART RATE: 73 BPM | WEIGHT: 143 LBS

## 2019-01-25 PROCEDURE — 99211 OFF/OP EST MAY X REQ PHY/QHP: CPT

## 2019-01-25 RX ORDER — HYDROCODONE BITARTRATE AND ACETAMINOPHEN 5; 325 MG/1; MG/1
1-2 TABLET ORAL EVERY 4 HOURS PRN
Qty: 30 TABLET | Refills: 0 | Status: ON HOLD | OUTPATIENT
Start: 2019-01-25 | End: 2019-11-04

## 2019-01-25 NOTE — PROGRESS NOTES
Thoracic Surgery Postoperative Note    CC: initial post op visit    She is a 66year old female who presents today in follow up after a right video assisted upper lobe wedge resection and lymph node dissection. She is doing well.  She complains of drowsines Take 100 mg by mouth 2 (two) times daily as needed for constipation. , Disp: 30 capsule, Rfl: 0  •  OxyCODONE HCl 5 MG Oral Tab, Take 1 tablet (5 mg total) by mouth every 4 (four) hours as needed. , Disp: 40 tablet, Rfl: 0  •  atorvastatin 10 MG Oral Tab, Ta Exam:    General: well appearing female in no acute distress  HEENT: Normocephalic, PERRL, EOMI  Neck: Supple, trachea midline, no JVD, no masses.  Thyroid not grossly enlarged  Nodes: no cervical or supraclavicular lymphadenopathy appreciated  Heart: regul

## 2019-02-04 NOTE — DISCHARGE SUMMARY
[facility]    Discharge Summary    Mega Lanier Patient Status:  Inpatient    11/3/1960 MRN [id number]   [facility] 7NE-A Attending No att. providers found   Hosp Day # 2 PCP Blair Mays MD     Date of Admission: 2019 Disp Course: Patient underwent surgery on 1/17/2019. Final Pathology showed an inflammatory nodule. She did well post-operatively. After surgery she went to the general floor. There she was able to get up and around on her own and tolerate a general diet.  Once Metoprolol Succinate  MG Tb24  Commonly known as:  TOPROL-XL      Take 1 tablet (200 mg total) by mouth once daily.    Quantity:  90 tablet  Refills:  3     Montelukast Sodium 10 MG Tabs  Commonly known as:  SINGULAIR      TAKE 1 TABLET BY MOUTH IN

## 2019-02-19 RX ORDER — MIRABEGRON 50 MG/1
TABLET, FILM COATED, EXTENDED RELEASE ORAL
Qty: 30 TABLET | Refills: 3 | Status: SHIPPED | OUTPATIENT
Start: 2019-02-19 | End: 2019-06-20

## 2019-03-20 RX ORDER — ESTRADIOL 0.1 MG/G
CREAM VAGINAL
Qty: 42.5 G | Refills: 3 | Status: SHIPPED | OUTPATIENT
Start: 2019-03-20 | End: 2019-09-17

## 2019-05-06 ENCOUNTER — OFFICE VISIT (OUTPATIENT)
Dept: UROLOGY | Facility: HOSPITAL | Age: 59
End: 2019-05-06
Attending: OBSTETRICS & GYNECOLOGY
Payer: COMMERCIAL

## 2019-05-06 VITALS
DIASTOLIC BLOOD PRESSURE: 60 MMHG | SYSTOLIC BLOOD PRESSURE: 126 MMHG | BODY MASS INDEX: 24.41 KG/M2 | WEIGHT: 143 LBS | HEIGHT: 64 IN

## 2019-05-06 DIAGNOSIS — N95.2 POSTMENOPAUSAL ATROPHIC VAGINITIS: ICD-10-CM

## 2019-05-06 DIAGNOSIS — N39.41 URGE INCONTINENCE: ICD-10-CM

## 2019-05-06 DIAGNOSIS — R39.15 URGENCY OF URINATION: Primary | ICD-10-CM

## 2019-05-06 DIAGNOSIS — N32.81 OAB (OVERACTIVE BLADDER): ICD-10-CM

## 2019-05-06 PROCEDURE — 99211 OFF/OP EST MAY X REQ PHY/QHP: CPT

## 2019-06-25 RX ORDER — MIRABEGRON 50 MG/1
TABLET, FILM COATED, EXTENDED RELEASE ORAL
Qty: 60 TABLET | Refills: 3 | Status: ON HOLD | OUTPATIENT
Start: 2019-06-25 | End: 2019-11-04

## 2019-08-17 ENCOUNTER — HOSPITAL ENCOUNTER (OUTPATIENT)
Age: 59
Discharge: HOME | End: 2019-08-17
Payer: COMMERCIAL

## 2019-08-17 VITALS
SYSTOLIC BLOOD PRESSURE: 118 MMHG | HEART RATE: 62 BPM | DIASTOLIC BLOOD PRESSURE: 80 MMHG | RESPIRATION RATE: 16 BRPM | TEMPERATURE: 99 F | OXYGEN SATURATION: 96 %

## 2019-08-17 DIAGNOSIS — K12.2 ABSCESS OF BUCCAL SPACE OF MOUTH: Primary | ICD-10-CM

## 2019-08-17 PROCEDURE — 99203 OFFICE O/P NEW LOW 30 MIN: CPT

## 2019-08-17 PROCEDURE — 99213 OFFICE O/P EST LOW 20 MIN: CPT

## 2019-08-17 RX ORDER — FLUTICASONE PROPIONATE 50 MCG
2 SPRAY, SUSPENSION (ML) NASAL DAILY
COMMUNITY

## 2019-08-17 RX ORDER — CEPHALEXIN 500 MG/1
500 CAPSULE ORAL 4 TIMES DAILY
Qty: 28 CAPSULE | Refills: 0 | Status: SHIPPED | OUTPATIENT
Start: 2019-08-17 | End: 2019-08-24

## 2019-08-17 NOTE — ED PROVIDER NOTES
Patient Seen in: [facility]    History   Patient presents with:  Mouth Cold Sores (respiratory/integumentary)    Stated Complaint: Mouth Sore    HPI    CHIEF COMPLAINT: Possible infection of the right cheek    HISTORY OF PRESENT • Other and unspecified hyperlipidemia    • Pap smear for cervical cancer screening 6/2011    wnl   • Seizure [facility]) 2004    one seizure that was heart related - defibritaltor put in    • Seizure disorder [facility])    • Sleep apnea     CPAP   • Unspecified esse Types: Cigarettes        Start date: 1979        Quit date: 2014        Years since quittin.9      Smokeless tobacco: Never Used      Tobacco comment: smokes e cigarette now, quiting/at least 30 pack hx per md note    Alcohol use:  No Patient was screened and evaluated during this visit. I determined, within reasonable clinical confidence and prior to discharge, that an emergency medical condition was not or was no longer present.   There was no indication for further evaluation, treat

## 2019-08-17 NOTE — ED INITIAL ASSESSMENT (HPI)
Complains of a sore to the right upper gum x 2 days. States that she is now experiencing a pustular discharge . Denies fever.

## 2019-09-18 RX ORDER — ESTRADIOL 0.1 MG/G
CREAM VAGINAL
Qty: 42.5 G | Refills: 3 | Status: SHIPPED | OUTPATIENT
Start: 2019-09-18 | End: 2021-06-09

## 2019-10-14 ENCOUNTER — LABORATORY ENCOUNTER (OUTPATIENT)
Dept: LAB | Facility: HOSPITAL | Age: 59
End: 2019-10-14
Attending: ORTHOPAEDIC SURGERY
Payer: COMMERCIAL

## 2019-10-14 ENCOUNTER — HOSPITAL ENCOUNTER (OUTPATIENT)
Dept: PHYSICAL THERAPY | Facility: HOSPITAL | Age: 59
Discharge: HOME | End: 2019-10-14
Attending: ORTHOPAEDIC SURGERY
Payer: COMMERCIAL

## 2019-10-14 DIAGNOSIS — M16.11 PRIMARY OSTEOARTHRITIS OF RIGHT HIP: ICD-10-CM

## 2019-10-14 PROCEDURE — 80053 COMPREHEN METABOLIC PANEL: CPT

## 2019-10-14 PROCEDURE — 85610 PROTHROMBIN TIME: CPT

## 2019-10-14 PROCEDURE — 85730 THROMBOPLASTIN TIME PARTIAL: CPT

## 2019-10-14 PROCEDURE — 36415 COLL VENOUS BLD VENIPUNCTURE: CPT

## 2019-10-14 PROCEDURE — 86901 BLOOD TYPING SEROLOGIC RH(D): CPT

## 2019-10-14 PROCEDURE — 93010 ELECTROCARDIOGRAM REPORT: CPT | Performed by: INTERNAL MEDICINE

## 2019-10-14 PROCEDURE — 86900 BLOOD TYPING SEROLOGIC ABO: CPT

## 2019-10-14 PROCEDURE — 93005 ELECTROCARDIOGRAM TRACING: CPT

## 2019-10-14 PROCEDURE — 86850 RBC ANTIBODY SCREEN: CPT

## 2019-10-14 PROCEDURE — 87081 CULTURE SCREEN ONLY: CPT

## 2019-10-14 PROCEDURE — 85025 COMPLETE CBC W/AUTO DIFF WBC: CPT

## 2019-10-17 ENCOUNTER — ANESTHESIA EVENT (OUTPATIENT)
Dept: SURGERY | Facility: HOSPITAL | Age: 59
DRG: 470 | End: 2019-10-17
Payer: COMMERCIAL

## 2019-10-24 NOTE — H&P (VIEW-ONLY)
Linwood Strong is a 66year old female who presents for a pre-operative physical exam. Patient is to have Rt MARIO, to be done by Dr. Jose Faustin at [facility] on 11/4/19. HPI:   Pt complains of chronic Rt arthritis of the hip.   Has failed conservative th MG/3ML) 0.083% Inhalation Nebu Solfrances, USE 1 AMPULE IN NEBULIZER EVERY SIX HOURS , Disp: 75 mL, Rfl: 1  Spacer/Aero-Holding Chambers (AEROCHAMBER MV) Does not apply Misc, use with inhaler, Disp: 1 Each, Rfl: 0       Allergies:   Levaquin                HIVES • COLONOSCOPY  10/18/17= Diverticulosis    Repeat 2022   • COLONOSCOPY N/A 10/18/2017    Performed by Martha Turner MD at [facility] ENDOSCOPY   • COLPOSCOPY, CERVIX W/UPPER ADJACENT VAGINA; W/BIOPSY(S), CERVIX  1985   • CRYOCAUTERY OF CERVIX  1985    CECE   • SYSTEMS:   GENERAL: feels well otherwise  SKIN: denies any unusual skin lesions  EYES:denies blurred vision or double vision  HEENT: denies nasal congestion, sinus pain or ST  LUNGS: denies shortness of breath with exertion  CARDIOVASCULAR: denies chest pa currently controlled, continue Trelegy    (G47.33,  Z99.89) INDRA on CPAP  Plan: continue CPAP post op    (I42.8) Arrhythmogenic right ventricular dysplasia (HCC)  (R07.2) Alcoholic cardiomyopathy ([facility]  (Z95.810) ICD (implantable cardioverter-defibrillator)

## 2019-11-01 NOTE — ICD/PM
No change to Medtronic ICD for hip surgery. Grounding pad on thigh, opposite side of ICD if able. Routine outpatient follow up.     Olga Swann NP

## 2019-11-04 ENCOUNTER — APPOINTMENT (OUTPATIENT)
Dept: GENERAL RADIOLOGY | Facility: HOSPITAL | Age: 59
DRG: 470 | End: 2019-11-04
Attending: ORTHOPAEDIC SURGERY
Payer: COMMERCIAL

## 2019-11-04 ENCOUNTER — ANESTHESIA (OUTPATIENT)
Dept: SURGERY | Facility: HOSPITAL | Age: 59
DRG: 470 | End: 2019-11-04
Payer: COMMERCIAL

## 2019-11-04 ENCOUNTER — HOSPITAL ENCOUNTER (INPATIENT)
Facility: HOSPITAL | Age: 59
LOS: 1 days | Discharge: HOME HEALTH | DRG: 470 | End: 2019-11-05
Attending: ORTHOPAEDIC SURGERY | Admitting: ORTHOPAEDIC SURGERY
Payer: COMMERCIAL

## 2019-11-04 DIAGNOSIS — M16.11 PRIMARY OSTEOARTHRITIS OF RIGHT HIP: Primary | ICD-10-CM

## 2019-11-04 PROCEDURE — 97161 PT EVAL LOW COMPLEX 20 MIN: CPT

## 2019-11-04 PROCEDURE — 97116 GAIT TRAINING THERAPY: CPT

## 2019-11-04 PROCEDURE — 0SR904A REPLACEMENT OF RIGHT HIP JOINT WITH CERAMIC ON POLYETHYLENE SYNTHETIC SUBSTITUTE, UNCEMENTED, OPEN APPROACH: ICD-10-PCS | Performed by: ORTHOPAEDIC SURGERY

## 2019-11-04 PROCEDURE — 88311 DECALCIFY TISSUE: CPT | Performed by: ORTHOPAEDIC SURGERY

## 2019-11-04 PROCEDURE — 94660 CPAP INITIATION&MGMT: CPT

## 2019-11-04 PROCEDURE — 88304 TISSUE EXAM BY PATHOLOGIST: CPT | Performed by: ORTHOPAEDIC SURGERY

## 2019-11-04 PROCEDURE — 76000 FLUOROSCOPY <1 HR PHYS/QHP: CPT | Performed by: ORTHOPAEDIC SURGERY

## 2019-11-04 PROCEDURE — 76942 ECHO GUIDE FOR BIOPSY: CPT | Performed by: ORTHOPAEDIC SURGERY

## 2019-11-04 PROCEDURE — 3E0T3BZ INTRODUCTION OF ANESTHETIC AGENT INTO PERIPHERAL NERVES AND PLEXI, PERCUTANEOUS APPROACH: ICD-10-PCS | Performed by: ANESTHESIOLOGY

## 2019-11-04 DEVICE — BIOLOX DELTA CERAMIC FEMORAL HEAD 32MM DIA +5.0 12/14 TAPER
Type: IMPLANTABLE DEVICE | Site: HIP | Status: FUNCTIONAL
Brand: BIOLOX DELTA

## 2019-11-04 DEVICE — PINNACLE POROCOAT ACETABULAR SHELL SECTOR II 48MM OD
Type: IMPLANTABLE DEVICE | Site: HIP | Status: FUNCTIONAL
Brand: PINNACLE POROCOAT

## 2019-11-04 DEVICE — PINNACLE HIP SOLUTIONS ALTRX POLYETHYLENE ACETABULAR LINER NEUTRAL 32MM ID 48MM OD
Type: IMPLANTABLE DEVICE | Site: HIP | Status: FUNCTIONAL
Brand: PINNACLE ALTRX

## 2019-11-04 DEVICE — CORAIL HIP SYSTEM CEMENTLESS FEMORAL STEM 12/14 AMT 125 DEGREES KLA SIZE 11 HA COATED HIGH OFFSET COLLAR
Type: IMPLANTABLE DEVICE | Site: HIP | Status: FUNCTIONAL
Brand: CORAIL

## 2019-11-04 RX ORDER — DIPHENHYDRAMINE HYDROCHLORIDE 50 MG/ML
12.5 INJECTION INTRAMUSCULAR; INTRAVENOUS EVERY 4 HOURS PRN
Status: DISCONTINUED | OUTPATIENT
Start: 2019-11-04 | End: 2019-11-05

## 2019-11-04 RX ORDER — BUPIVACAINE HYDROCHLORIDE AND EPINEPHRINE 5; 5 MG/ML; UG/ML
INJECTION, SOLUTION EPIDURAL; INTRACAUDAL; PERINEURAL AS NEEDED
Status: DISCONTINUED | OUTPATIENT
Start: 2019-11-04 | End: 2019-11-04 | Stop reason: SURG

## 2019-11-04 RX ORDER — PROCHLORPERAZINE EDISYLATE 5 MG/ML
10 INJECTION INTRAMUSCULAR; INTRAVENOUS EVERY 6 HOURS PRN
Status: DISCONTINUED | OUTPATIENT
Start: 2019-11-04 | End: 2019-11-05

## 2019-11-04 RX ORDER — CELECOXIB 200 MG/1
200 CAPSULE ORAL DAILY
Qty: 30 CAPSULE | Refills: 0 | Status: SHIPPED | OUTPATIENT
Start: 2019-11-04 | End: 2020-03-21 | Stop reason: ALTCHOICE

## 2019-11-04 RX ORDER — ESCITALOPRAM OXALATE 10 MG/1
10 TABLET ORAL DAILY
Status: DISCONTINUED | OUTPATIENT
Start: 2019-11-04 | End: 2019-11-05

## 2019-11-04 RX ORDER — RAMIPRIL 5 MG/1
5 CAPSULE ORAL DAILY
COMMUNITY
End: 2020-06-05

## 2019-11-04 RX ORDER — ACETAMINOPHEN 500 MG
1000 TABLET ORAL ONCE
Status: DISCONTINUED | OUTPATIENT
Start: 2019-11-04 | End: 2019-11-04

## 2019-11-04 RX ORDER — METOPROLOL SUCCINATE 200 MG/1
200 TABLET, EXTENDED RELEASE ORAL DAILY
COMMUNITY
End: 2020-06-05

## 2019-11-04 RX ORDER — HYDROCODONE BITARTRATE AND ACETAMINOPHEN 10; 325 MG/1; MG/1
2 TABLET ORAL EVERY 4 HOURS PRN
Status: DISCONTINUED | OUTPATIENT
Start: 2019-11-05 | End: 2019-11-05

## 2019-11-04 RX ORDER — DIPHENHYDRAMINE HCL 25 MG
25 CAPSULE ORAL EVERY 4 HOURS PRN
Status: DISCONTINUED | OUTPATIENT
Start: 2019-11-04 | End: 2019-11-05

## 2019-11-04 RX ORDER — ACETAMINOPHEN 325 MG/1
TABLET ORAL
Status: COMPLETED
Start: 2019-11-04 | End: 2019-11-04

## 2019-11-04 RX ORDER — MEPERIDINE HYDROCHLORIDE 25 MG/ML
25 INJECTION INTRAMUSCULAR; INTRAVENOUS; SUBCUTANEOUS
Status: DISCONTINUED | OUTPATIENT
Start: 2019-11-04 | End: 2019-11-04 | Stop reason: HOSPADM

## 2019-11-04 RX ORDER — METOCLOPRAMIDE HYDROCHLORIDE 5 MG/ML
10 INJECTION INTRAMUSCULAR; INTRAVENOUS EVERY 6 HOURS PRN
Status: DISCONTINUED | OUTPATIENT
Start: 2019-11-04 | End: 2019-11-05

## 2019-11-04 RX ORDER — ACETAMINOPHEN 325 MG/1
650 TABLET ORAL ONCE
Status: COMPLETED | OUTPATIENT
Start: 2019-11-04 | End: 2019-11-04

## 2019-11-04 RX ORDER — HYDROMORPHONE HYDROCHLORIDE 1 MG/ML
0.8 INJECTION, SOLUTION INTRAMUSCULAR; INTRAVENOUS; SUBCUTANEOUS EVERY 2 HOUR PRN
Status: DISCONTINUED | OUTPATIENT
Start: 2019-11-04 | End: 2019-11-05

## 2019-11-04 RX ORDER — ATORVASTATIN CALCIUM 10 MG/1
10 TABLET, FILM COATED ORAL NIGHTLY
COMMUNITY
End: 2020-06-05

## 2019-11-04 RX ORDER — OXYCODONE HYDROCHLORIDE 5 MG/1
5 TABLET ORAL EVERY 4 HOURS PRN
Status: ACTIVE | OUTPATIENT
Start: 2019-11-04 | End: 2019-11-05

## 2019-11-04 RX ORDER — MIDAZOLAM HYDROCHLORIDE 1 MG/ML
1 INJECTION INTRAMUSCULAR; INTRAVENOUS EVERY 5 MIN PRN
Status: DISCONTINUED | OUTPATIENT
Start: 2019-11-04 | End: 2019-11-04 | Stop reason: HOSPADM

## 2019-11-04 RX ORDER — MIDAZOLAM HYDROCHLORIDE 1 MG/ML
INJECTION INTRAMUSCULAR; INTRAVENOUS AS NEEDED
Status: DISCONTINUED | OUTPATIENT
Start: 2019-11-04 | End: 2019-11-04 | Stop reason: SURG

## 2019-11-04 RX ORDER — BUPIVACAINE HYDROCHLORIDE 7.5 MG/ML
INJECTION, SOLUTION INTRASPINAL AS NEEDED
Status: DISCONTINUED | OUTPATIENT
Start: 2019-11-04 | End: 2019-11-04 | Stop reason: SURG

## 2019-11-04 RX ORDER — OXYCODONE HYDROCHLORIDE 10 MG/1
10 TABLET ORAL EVERY 4 HOURS PRN
Status: DISPENSED | OUTPATIENT
Start: 2019-11-04 | End: 2019-11-05

## 2019-11-04 RX ORDER — ATORVASTATIN CALCIUM 10 MG/1
10 TABLET, FILM COATED ORAL NIGHTLY
Status: DISCONTINUED | OUTPATIENT
Start: 2019-11-04 | End: 2019-11-05

## 2019-11-04 RX ORDER — SODIUM PHOSPHATE, DIBASIC AND SODIUM PHOSPHATE, MONOBASIC 7; 19 G/133ML; G/133ML
1 ENEMA RECTAL ONCE AS NEEDED
Status: DISCONTINUED | OUTPATIENT
Start: 2019-11-04 | End: 2019-11-05

## 2019-11-04 RX ORDER — MONTELUKAST SODIUM 10 MG/1
10 TABLET ORAL NIGHTLY
COMMUNITY
End: 2020-01-27

## 2019-11-04 RX ORDER — OXYCODONE HYDROCHLORIDE 15 MG/1
15 TABLET ORAL EVERY 4 HOURS PRN
Status: ACTIVE | OUTPATIENT
Start: 2019-11-04 | End: 2019-11-05

## 2019-11-04 RX ORDER — SCOLOPAMINE TRANSDERMAL SYSTEM 1 MG/1
1 PATCH, EXTENDED RELEASE TRANSDERMAL ONCE
Status: DISCONTINUED | OUTPATIENT
Start: 2019-11-04 | End: 2019-11-05

## 2019-11-04 RX ORDER — METOPROLOL SUCCINATE 100 MG/1
200 TABLET, EXTENDED RELEASE ORAL
Status: DISCONTINUED | OUTPATIENT
Start: 2019-11-04 | End: 2019-11-05

## 2019-11-04 RX ORDER — DOCUSATE SODIUM 100 MG/1
100 CAPSULE, LIQUID FILLED ORAL 2 TIMES DAILY
Status: DISCONTINUED | OUTPATIENT
Start: 2019-11-04 | End: 2019-11-05

## 2019-11-04 RX ORDER — POLYETHYLENE GLYCOL 3350 17 G/17G
17 POWDER, FOR SOLUTION ORAL DAILY PRN
Status: DISCONTINUED | OUTPATIENT
Start: 2019-11-04 | End: 2019-11-05

## 2019-11-04 RX ORDER — SODIUM CHLORIDE 9 MG/ML
INJECTION, SOLUTION INTRAVENOUS CONTINUOUS
Status: DISCONTINUED | OUTPATIENT
Start: 2019-11-04 | End: 2019-11-05

## 2019-11-04 RX ORDER — MONTELUKAST SODIUM 10 MG/1
10 TABLET ORAL NIGHTLY
Status: DISCONTINUED | OUTPATIENT
Start: 2019-11-04 | End: 2019-11-05

## 2019-11-04 RX ORDER — ONDANSETRON 2 MG/ML
4 INJECTION INTRAMUSCULAR; INTRAVENOUS EVERY 4 HOURS PRN
Status: DISCONTINUED | OUTPATIENT
Start: 2019-11-04 | End: 2019-11-05

## 2019-11-04 RX ORDER — DIPHENHYDRAMINE HYDROCHLORIDE 50 MG/ML
25 INJECTION INTRAMUSCULAR; INTRAVENOUS ONCE AS NEEDED
Status: ACTIVE | OUTPATIENT
Start: 2019-11-04 | End: 2019-11-04

## 2019-11-04 RX ORDER — ACETAMINOPHEN 325 MG/1
650 TABLET ORAL 4 TIMES DAILY
Status: DISPENSED | OUTPATIENT
Start: 2019-11-04 | End: 2019-11-05

## 2019-11-04 RX ORDER — SENNOSIDES 8.6 MG
17.2 TABLET ORAL NIGHTLY
Status: DISCONTINUED | OUTPATIENT
Start: 2019-11-04 | End: 2019-11-05

## 2019-11-04 RX ORDER — ESCITALOPRAM OXALATE 10 MG/1
10 TABLET ORAL DAILY
COMMUNITY
End: 2020-04-22

## 2019-11-04 RX ORDER — METOCLOPRAMIDE HYDROCHLORIDE 5 MG/ML
10 INJECTION INTRAMUSCULAR; INTRAVENOUS AS NEEDED
Status: DISCONTINUED | OUTPATIENT
Start: 2019-11-04 | End: 2019-11-04 | Stop reason: HOSPADM

## 2019-11-04 RX ORDER — DOCUSATE SODIUM 100 MG/1
100 CAPSULE, LIQUID FILLED ORAL 2 TIMES DAILY
Qty: 60 CAPSULE | Refills: 0 | Status: SHIPPED | OUTPATIENT
Start: 2019-11-04 | End: 2020-03-21 | Stop reason: ALTCHOICE

## 2019-11-04 RX ORDER — ONDANSETRON 2 MG/ML
4 INJECTION INTRAMUSCULAR; INTRAVENOUS AS NEEDED
Status: DISCONTINUED | OUTPATIENT
Start: 2019-11-04 | End: 2019-11-04 | Stop reason: HOSPADM

## 2019-11-04 RX ORDER — BUPROPION HYDROCHLORIDE 300 MG/1
300 TABLET ORAL DAILY
COMMUNITY
End: 2020-04-24

## 2019-11-04 RX ORDER — HYDROCODONE BITARTRATE AND ACETAMINOPHEN 10; 325 MG/1; MG/1
TABLET ORAL
Qty: 30 TABLET | Refills: 0 | Status: SHIPPED | OUTPATIENT
Start: 2019-11-04 | End: 2020-03-21 | Stop reason: ALTCHOICE

## 2019-11-04 RX ORDER — BUPROPION HYDROCHLORIDE 300 MG/1
300 TABLET ORAL DAILY
Status: DISCONTINUED | OUTPATIENT
Start: 2019-11-04 | End: 2019-11-05

## 2019-11-04 RX ORDER — HYDROMORPHONE HYDROCHLORIDE 1 MG/ML
0.5 INJECTION, SOLUTION INTRAMUSCULAR; INTRAVENOUS; SUBCUTANEOUS EVERY 5 MIN PRN
Status: DISCONTINUED | OUTPATIENT
Start: 2019-11-04 | End: 2019-11-04 | Stop reason: HOSPADM

## 2019-11-04 RX ORDER — HYDROMORPHONE HYDROCHLORIDE 1 MG/ML
0.4 INJECTION, SOLUTION INTRAMUSCULAR; INTRAVENOUS; SUBCUTANEOUS EVERY 2 HOUR PRN
Status: DISCONTINUED | OUTPATIENT
Start: 2019-11-04 | End: 2019-11-05

## 2019-11-04 RX ORDER — HYDROCODONE BITARTRATE AND ACETAMINOPHEN 10; 325 MG/1; MG/1
1 TABLET ORAL EVERY 4 HOURS PRN
Status: DISCONTINUED | OUTPATIENT
Start: 2019-11-05 | End: 2019-11-05

## 2019-11-04 RX ORDER — NALOXONE HYDROCHLORIDE 0.4 MG/ML
80 INJECTION, SOLUTION INTRAMUSCULAR; INTRAVENOUS; SUBCUTANEOUS AS NEEDED
Status: DISCONTINUED | OUTPATIENT
Start: 2019-11-04 | End: 2019-11-04 | Stop reason: HOSPADM

## 2019-11-04 RX ORDER — RAMIPRIL 5 MG/1
5 CAPSULE ORAL DAILY
Status: DISCONTINUED | OUTPATIENT
Start: 2019-11-05 | End: 2019-11-05

## 2019-11-04 RX ORDER — BUPIVACAINE HYDROCHLORIDE AND EPINEPHRINE 2.5; 5 MG/ML; UG/ML
INJECTION, SOLUTION EPIDURAL; INFILTRATION; INTRACAUDAL; PERINEURAL AS NEEDED
Status: DISCONTINUED | OUTPATIENT
Start: 2019-11-04 | End: 2019-11-04 | Stop reason: SURG

## 2019-11-04 RX ORDER — KETOROLAC TROMETHAMINE 30 MG/ML
30 INJECTION, SOLUTION INTRAMUSCULAR; INTRAVENOUS EVERY 6 HOURS
Status: COMPLETED | OUTPATIENT
Start: 2019-11-04 | End: 2019-11-05

## 2019-11-04 RX ORDER — HYDROMORPHONE HYDROCHLORIDE 1 MG/ML
0.2 INJECTION, SOLUTION INTRAMUSCULAR; INTRAVENOUS; SUBCUTANEOUS EVERY 2 HOUR PRN
Status: DISCONTINUED | OUTPATIENT
Start: 2019-11-04 | End: 2019-11-05

## 2019-11-04 RX ORDER — CEFAZOLIN SODIUM/WATER 2 G/20 ML
2 SYRINGE (ML) INTRAVENOUS EVERY 8 HOURS
Status: COMPLETED | OUTPATIENT
Start: 2019-11-04 | End: 2019-11-05

## 2019-11-04 RX ORDER — SODIUM CHLORIDE, SODIUM LACTATE, POTASSIUM CHLORIDE, CALCIUM CHLORIDE 600; 310; 30; 20 MG/100ML; MG/100ML; MG/100ML; MG/100ML
INJECTION, SOLUTION INTRAVENOUS CONTINUOUS
Status: DISCONTINUED | OUTPATIENT
Start: 2019-11-04 | End: 2019-11-05

## 2019-11-04 RX ORDER — BISACODYL 10 MG
10 SUPPOSITORY, RECTAL RECTAL
Status: DISCONTINUED | OUTPATIENT
Start: 2019-11-04 | End: 2019-11-05

## 2019-11-04 RX ORDER — CEFAZOLIN SODIUM/WATER 2 G/20 ML
2 SYRINGE (ML) INTRAVENOUS ONCE
Status: COMPLETED | OUTPATIENT
Start: 2019-11-04 | End: 2019-11-04

## 2019-11-04 RX ORDER — SODIUM CHLORIDE, SODIUM LACTATE, POTASSIUM CHLORIDE, CALCIUM CHLORIDE 600; 310; 30; 20 MG/100ML; MG/100ML; MG/100ML; MG/100ML
INJECTION, SOLUTION INTRAVENOUS CONTINUOUS
Status: DISCONTINUED | OUTPATIENT
Start: 2019-11-04 | End: 2019-11-04 | Stop reason: HOSPADM

## 2019-11-04 RX ORDER — TIZANIDINE 4 MG/1
4 TABLET ORAL 3 TIMES DAILY PRN
Status: DISCONTINUED | OUTPATIENT
Start: 2019-11-04 | End: 2019-11-05

## 2019-11-04 RX ADMIN — BUPIVACAINE HYDROCHLORIDE AND EPINEPHRINE 15 ML: 2.5; 5 INJECTION, SOLUTION EPIDURAL; INFILTRATION; INTRACAUDAL; PERINEURAL at 10:15:00

## 2019-11-04 RX ADMIN — BUPIVACAINE HYDROCHLORIDE AND EPINEPHRINE 15 ML: 5; 5 INJECTION, SOLUTION EPIDURAL; INTRACAUDAL; PERINEURAL at 10:16:00

## 2019-11-04 RX ADMIN — BUPIVACAINE HYDROCHLORIDE 1 ML: 7.5 INJECTION, SOLUTION INTRASPINAL at 12:31:00

## 2019-11-04 RX ADMIN — CEFAZOLIN SODIUM/WATER 2 G: 2 G/20 ML SYRINGE (ML) INTRAVENOUS at 10:10:00

## 2019-11-04 RX ADMIN — MIDAZOLAM HYDROCHLORIDE 2 MG: 1 INJECTION INTRAMUSCULAR; INTRAVENOUS at 10:09:00

## 2019-11-04 NOTE — ANESTHESIA POSTPROCEDURE EVALUATION
[facility] Patient Status:  Surgery Admit - Inpt   Age/Gender 64year old female MRN [id number]   [facility] SURGERY Attending Kayli Lantigua MD   Hosp Day # 0 PCP Екатерина Aragon MD       Anesthesia Post-op Note    Proced

## 2019-11-04 NOTE — PHYSICAL THERAPY NOTE
PHYSICAL THERAPY HIP EVALUATION - INPATIENT     Room Number: 352/352-A  Evaluation Date: 11/4/2019  Type of Evaluation: Initial  Physician Order: PT Eval and Treat    Presenting Problem: s/p R MARIO  Reason for Therapy: Mobility Dysfunction and Discharge Stephanie DEFIBRILLATOR PLACEMENT  2011    Medtronic single chamber ICD   • COLONOSCOPY  10/18/17= Diverticulosis    Repeat 2022   • COLONOSCOPY N/A 10/18/2017    Performed by Teri Connors MD at [facility] ENDOSCOPY   • COLPOSCOPY, CERVIX W/UPPER ADJACENT VAGINA; W/BIOPS mechanics; Relaxation;Repositioning    COGNITION  · Following Commands:  follows all commands and directions without difficulty    RANGE OF MOTION AND STRENGTH ASSESSMENT      Lower extremity ROM is within functional limits except for R hip, consistent with increasing pain. Stand to sit at commode over toilet with cues for hand and RLE placement. Ambulation x10ft to chair with min A. Noted multiple minor LOB posterior during gait 2/2 ongoing c/o's pain.     Exercise/Education Provided:  Bed mobility  Body m assistive device at assistance level: modified independent    Goal #4    Patient will negotiate 4 stairs/one curb w/ assistive device and supervision   Goal #5    Patient verbalizes and/or demonstrates all precautions and safety concerns independently   Go

## 2019-11-04 NOTE — OPERATIVE REPORT
[address] REPLACEMENT OPERATIVE REPORT    DATE OF SURGERY 11/4/2019    Vaishali Breana       [id number]     11/3/1960    PRE-OP DX:  RIGHT HIP PRIMARY OSTEOARTHRITIS  POST-OP DX:  RIGHT HIP PRIMARY OSTEOARTHRITIS  PROCEDURE:  DIRECT ANT DEGENERATIVE CHANGES WERE NOTED. FEMORAL NECK OSTEOTOMY WAS MADE. FEMORAL HEAD WAS REMOVED WITH A CORK SCREW. POSTERIOR AND INFERIOR ACETABULAR RETRACTORS WERE PLACED CAREFULLY. GOOD ACETABULAR EXPOSURE WAS OBTAINED. LABRAL TISSUE WAS EXCISED.   MED REMOVED. WOUND WAS IRRIGATED COPIOUSLY. HEMOSTASIS WAS OBTAINED. ACETABULUM WAS REEXPOSED. REAL LINER WAS IMPACTED. ITS SEATING WAS VERIFIED. PROXIMAL FEMUR WAS EXPOSED. REAL FEMORAL STEM WAS INSERTED WITH GOOD STABILITY. FEMORAL HEAD WAS IMPACTED.

## 2019-11-04 NOTE — INTERVAL H&P NOTE
Pre-op Diagnosis: Primary osteoarthritis of right hip [M16.11]    The above referenced H&P was reviewed by Tony Sherwood MD on 11/4/2019, the patient was examined and no significant changes have occurred in the patient's condition since the H&P was performed

## 2019-11-04 NOTE — ANESTHESIA PROCEDURE NOTES
Regional [facility]  Performed by: Galina Mercedes MD  Authorized by: Galina Mercedes MD       General Information and Staff    Start Time:  11/4/2019 10:15 AM  End Time:  11/4/2019 10:20 AM  Anesthesiologist:  Ashanti Blanco MD  Performed by:   Anesthesiologis

## 2019-11-04 NOTE — CONSULTS
General Medicine Consult      Consulted by: Brayan Carver MD    PCP: Bernabe Pelletier MD    Reason for Consultation: Medical Management    History of Present Illness: Patient is a 64year old female with PMH including but not limited to COPD, CM s/p defibrillat History:   Procedure Laterality Date   • BLADDER TRANSVAGINAL TAPING SUSPENSION URETHROLYSIS N/A 12/9/2016    Performed by Lyssa Otero MD at [facility]  2011    Medtronic single chamber ICD   • COLONOSCOPY  10/18/17 COMMENTS)    Comment:Pt c/o HA  Other                       Comment:Mold, hayfever, horses, cats-nasal symptoms, itchy             eyes     Soc Hx:  Social History    Tobacco Use      Smoking status: Former Smoker        Packs/day: 1.00        Years: 35.00 FLUOROSCOPY IMAGES OBTAINED:  5 FLUOROSCOPY TIME:  34 seconds TECHNOLOGIST TIME:  1 hour 30 minutes RADIATION DOSE (AIR KERMA PRODUCT):  2.0041mGy   FINDINGS:  Images were obtained during right total hip arthroplasty.   Preoperative image demonstrates sever including Екатерина Aragon MD note       Patient and /friend given opportunity to ask questions and note understanding and agreeing with therapeutic plan as outlined.  D/w RN Franky Mi      Thank you for allowing me to participate in the care of this patient

## 2019-11-04 NOTE — PROGRESS NOTES
Nelda Urias   8/29/2019 8:50 AM   Office Visit   MRN:  [id number]   Description: 66year old female Provider: Bran Anderson MD Department: [facility]   Scanning Cover Sheet     Click to print Ashley Marrufoe 852 for scanning   Office ATORVASTATIN 10 MG Oral Tab TAKE 1 TABLET BY MOUTH IN THE EVENING  Disp: 90 tablet Rfl: 3   MONTELUKAST SODIUM 10 MG Oral Tab TAKE 1 TABLET BY MOUTH IN THE EVENING  Disp: 30 tablet Rfl: 5   MYRBETRIQ 50 MG Oral Tablet 24 Hr TAKE 1 TABLET BY MOUTH ONE TIME • High blood pressure     • High cholesterol     • History of suburethral sling procedure 12/2016   • Major depressive disorder 4/3/2013   • Osteoarthritis       right hip bone spurs   • Osteopenia     • Other and unspecified hyperlipidemia     • Pap smear Social History:  Social History    Tobacco Use      Smoking status: Former Smoker        Packs/day: 1.00        Years: 35.00        Pack years: 35        Types: Cigarettes        Start date: 1/13/1979        Quit date: 9/21/2014        Years since quitting Medical clearance. Home  Diagnoses and all orders for this visit:     Primary osteoarthritis of right hip  -     OFFICE/OUTPT VISIT,EST,LEVL III  -     [facility] SURG SCHED  -     XR HIP W OR WO PELVIS 2 OR 3 VIEWS, RIGHT (CPT=73502);  Future     Other orders  -

## 2019-11-04 NOTE — ANESTHESIA PROCEDURE NOTES
Spinal Block  Performed by: Scout Mercedes MD  Authorized by: Scout Mercedes MD       General Information and Staff    Start Time:  11/4/2019 10:10 AM  End Time:  11/4/2019 10:15 AM  Anesthesiologist:  Pat Akins MD  Performed by:   Anesthesiologist

## 2019-11-05 VITALS
OXYGEN SATURATION: 96 % | RESPIRATION RATE: 18 BRPM | HEIGHT: 64 IN | HEART RATE: 72 BPM | BODY MASS INDEX: 26 KG/M2 | TEMPERATURE: 98 F | WEIGHT: 152.31 LBS | DIASTOLIC BLOOD PRESSURE: 47 MMHG | SYSTOLIC BLOOD PRESSURE: 113 MMHG

## 2019-11-05 PROCEDURE — 85027 COMPLETE CBC AUTOMATED: CPT | Performed by: PHYSICIAN ASSISTANT

## 2019-11-05 PROCEDURE — 97150 GROUP THERAPEUTIC PROCEDURES: CPT

## 2019-11-05 PROCEDURE — 97116 GAIT TRAINING THERAPY: CPT

## 2019-11-05 PROCEDURE — 97535 SELF CARE MNGMENT TRAINING: CPT

## 2019-11-05 PROCEDURE — 97165 OT EVAL LOW COMPLEX 30 MIN: CPT

## 2019-11-05 PROCEDURE — 80048 BASIC METABOLIC PNL TOTAL CA: CPT | Performed by: PHYSICIAN ASSISTANT

## 2019-11-05 NOTE — OCCUPATIONAL THERAPY NOTE
OCCUPATIONAL THERAPY QUICK EVALUATION - INPATIENT    Room Number: 352/352-A  Evaluation Date: 11/5/2019     Type of Evaluation: Quick Eval  Presenting Problem: R MARIO    Physician Order: IP Consult to Occupational Therapy  Reason for Therapy:  ADL/IADL Dysf MAIN OR   • CARDIAC DEFIBRILLATOR PLACEMENT  2011    Medtronic single chamber ICD   • COLONOSCOPY  10/18/17= Diverticulosis    Repeat 2022   • COLONOSCOPY N/A 10/18/2017    Performed by Iris Cosby MD at [facility] ENDOSCOPY   • COLPOSCOPY, CERVIX W/UPPER JOSE R well.    SUBJECTIVE  \"I feel fine except for an ache. \"    Patient self-stated goal is to go home.     OBJECTIVE  Precautions: MARIO - anterior  Fall Risk: Standard fall risk    WEIGHT BEARING RESTRICTION  Weight Bearing Restriction: R lower extremity in LB dressing lamar/doffing socks at [facility] [address] while seated utilizing reacher, dressing stick, and sock aide. Pt required MIN A to lamar tight fitting boots due to anterior hip precautions.  Pt educated on importance of wearing comfortable clothing that is easy t has been evaluated and presents with no skilled Occupational Therapy needs at this time. Patient discharged from Occupational Therapy services. Please re-order if a new functional limitation presents during this admission.     Patient was able to achieve

## 2019-11-05 NOTE — RESPIRATORY THERAPY NOTE
INDRA : EQUIPMENT USE: DAILY SUMMARY                                            SET MODE: AUTO CPAP WITH CFLEX                                          USAGE IN HOURS:4:52                                          90%

## 2019-11-05 NOTE — PROGRESS NOTES
Orthopedic surgery progress note    Zacarias Quick Patient Status:  Inpatient    11/3/1960 MRN [id number]   [facility] 3SW-A Attending Tevin Branham MD   [facility] Day # 1 PCP Pranav Mike MD       Subjective:  Hip pain is not as bad as she wa

## 2019-11-05 NOTE — HOME CARE LIAISON
MET WITH PTNT AND OFFERED CHOICE  OF AGENCIES. PTNT AGREEABLE TO [facility]. MET WITH PTNT TO DISCUSS HOME HEALTH SERVICES AND COVERAGE CRITERIA. PTNT AGREEABLE TO Eliecer Fernandez. PTNT GIVEN RESIDENTIAL BROCHURE.  RESIDENTIAL WITH PROVIDE SN/PT ON DISC

## 2019-11-05 NOTE — PLAN OF CARE
Right hip pain well controlled on total joint pain protocol. Pt ambulated in room and sat in the chair with physical therapy this afternoon. Voiding in bathroom. Tolerating regular diet. Plan discharge home with home health care when ready.

## 2019-11-05 NOTE — PHYSICAL THERAPY NOTE
PHYSICAL THERAPY HIP TREATMENT NOTE - INPATIENT      Room Number: 352/352-A     Session: 1&2  Number of Visits to Meet Established Goals: 4    Presenting Problem: s/p R MARIO    History related to current admission: Pt was admitted from home on 11/4/2019 wit Diverticulosis    Repeat 2022   • COLONOSCOPY N/A 10/18/2017    Performed by Jennifer Garcia MD at [facility] ENDOSCOPY   • COLPOSCOPY, CERVIX W/UPPER ADJACENT VAGINA; W/BIOPSY(S), CERVIX  1985   • CRYOCAUTERY OF CERVIX  1985    CECE   • HYSTERECTOMY  6/26/2012 MOBILITY  How much difficulty does the patient currently have. ..  -   Turning over in bed (including adjusting bedclothes, sheets and blankets)?: None   -   Sitting down on and standing up from a chair with arms (e.g., wheelchair, bedside commode, etc.): A 15 reps   Sitting Knee Extension 10 reps 15 reps   Standing heel/toe raises 10 reps 15 reps   Hamstring Curls 10 reps 15 reps   Forward, back steps 10 reps 15 reps   Short Squats 10 reps 15 reps     Comments:  Pt participated in group session, tolerance wa

## 2019-11-05 NOTE — PROGRESS NOTES
[facility] Hospitalist Progress Note     PCP: Juan Pablo Rodriguez MD    Chief Complaint: follow-up    Overnight/Interim Events:      SUBJECTIVE:  Pt reports pain controlled, working c PT class. No cp/sob. Using IS. Had nausea yesterday, better now.      OBJECTIVE:  Tem Beta Blocker   • Montelukast Sodium  10 mg Oral Nightly   • ramipril  5 mg Oral Daily     • lactated ringers 20 mL/hr at 11/04/19 0845   • sodium chloride 83 mL/hr at 11/04/19 1758     tiZANidine HCl, HYDROmorphone HCl **OR** HYDROmorphone HCl **OR** HYDRO Jared Ramirez MD  [facility]  [phone]  11/5/2019  4:27 PM

## 2019-11-05 NOTE — PAYOR COMM NOTE
--------------  ADMISSION REVIEW     Payor: [address] [place] [address] EMILY/JESSICA  Subscriber #:  [id number]  Authorization Number: [id number]    Admit date: 11/4/19  Admit time: 1322       Admitting Physician: Michael Silveira MD  Attending Physician:  Tank Lane SUPERIOR AND INFERIOR EXTRACAPSULAR COBRA RETRACTORS WERE PLACED. ANTERIOR ACETABULAR RETRACTOR WAS PLACED IN A CAREFUL FASHION. ANTERIOR CAPSULECTOMY WAS MADE. FEMORAL HEAD AND NECK WERE IDENTIFIED. INFERIOR CAPSULAR RELEASE WAS PERFORMED.   Travis Desai TO BE AXIALLY AND ROTATIONALLY STABLE. TRIAL FEMORAL NECK AND HEAD WERE PLACED. HIP WAS VISUALIZED ON THE C ARM. LEG LENGTH AND OFFSET WERE ASSESSED. NO FRACTURE WAS IDENTIFIED. HIP WAS STABLE TO ANTERIOR STRESS. ALL THE TRIAL IMPLANTS WERE REMOVED. Isabel Flores RN    11/4/2019 1754 Given 2 g Intravenous Jp Humphrey RN      docusate sodium (COLACE) cap 100 mg     Date Action Dose Route User    11/5/2019 0819 Given 100 mg Oral Amos Civil, [place]    11/4/2019 2120 Given 100 mg Oral Evon Allen, [place] Date Action Dose Route User    11/4/2019 2117 Given 4 mg Oral Herson Molina, RN

## 2019-11-05 NOTE — PLAN OF CARE
Pt. Admitted for R total hip by Dr. Chad Elias on 11-04-19, POD 1. Pain level is well controlled. Ambulates with walker and min assist. Incision on R hip is cdi with aquacel, using gel ice, ABD pillow in place. VS remain stable. Voiding freely.  Last BM on 11-03

## 2019-11-05 NOTE — CM/SW NOTE
11/05/19 0900   CM/SW Referral Data   Referral Source Social Work (self-referral)   Reason for Referral Discharge planning   Discharge Needs   Anticipated D/C needs Home health care       HOME SITUATION  Type of Home: [facility]   Home Layout: Multi-lev

## 2019-11-05 NOTE — PLAN OF CARE
RECD ALERT ,AWAKE, SPOUSE AT BS. PT AND SPOUSE AWARE PLAN OF CARE TODAY. POSSIBLE D/C TODAY. CALL LIGHT W/IN REACH.  TO CALL FOR ALL NEEDS AND ASSISTANCE

## 2019-11-05 NOTE — PROGRESS NOTES
Orthopedic surgery progress note    Ailyn Chong Patient Status:  Inpatient    11/3/1960 MRN [id number]   [facility] 3SW-A Attending Chava Ardon MD   Hosp Day # 1 PCP Renny White MD       Subjective:  Right hip has more pain than she

## 2019-11-06 NOTE — PAYOR COMM NOTE
--------------  DISCHARGE REVIEW    Payor: Renetta DIAZ/JESSICA  Subscriber #:  [id number]  Authorization Number: [id number]    Admit date: 11/4/19  Admit time:  1322  Discharge Date: 11/5/2019  5:37 PM     Admitting Physician: Raven Hermosillo MD  At

## 2019-11-07 NOTE — CM/SW NOTE
11/07/19 0800   Discharge disposition   Expected discharge disposition Home-Health   Name of Facillity/Home Care/Hospice Residential   Discharge transportation Private car   DC 11/5/19

## 2020-02-19 RX ORDER — MIRABEGRON 50 MG/1
TABLET, FILM COATED, EXTENDED RELEASE ORAL
Qty: 60 TABLET | Refills: 3 | Status: SHIPPED | OUTPATIENT
Start: 2020-02-19 | End: 2020-10-13

## 2020-10-09 ENCOUNTER — APPOINTMENT (OUTPATIENT)
Dept: LAB | Age: 60
End: 2020-10-09
Attending: INTERNAL MEDICINE
Payer: COMMERCIAL

## 2020-10-09 ENCOUNTER — LAB ENCOUNTER (OUTPATIENT)
Dept: LAB | Age: 60
End: 2020-10-09
Attending: INTERNAL MEDICINE
Payer: COMMERCIAL

## 2020-10-09 DIAGNOSIS — Z01.818 PRE-OP TESTING: ICD-10-CM

## 2020-10-09 DIAGNOSIS — Z45.02 ICD (IMPLANTABLE CARDIOVERTER-DEFIBRILLATOR) BATTERY DEPLETION: ICD-10-CM

## 2020-10-09 DIAGNOSIS — Z45.02 IMPLANTABLE CARDIOVERTER-DEFIBRILLATOR (ICD) AT END OF BATTERY LIFE: ICD-10-CM

## 2020-10-09 DIAGNOSIS — I42.0 DCM (DILATED CARDIOMYOPATHY) (HCC): ICD-10-CM

## 2020-10-09 PROCEDURE — 80048 BASIC METABOLIC PNL TOTAL CA: CPT

## 2020-10-09 PROCEDURE — 36415 COLL VENOUS BLD VENIPUNCTURE: CPT

## 2020-10-09 PROCEDURE — 85025 COMPLETE CBC W/AUTO DIFF WBC: CPT

## 2020-10-12 ENCOUNTER — HOSPITAL ENCOUNTER (OUTPATIENT)
Dept: INTERVENTIONAL RADIOLOGY/VASCULAR | Facility: HOSPITAL | Age: 60
Discharge: HOME | End: 2020-10-12
Attending: INTERNAL MEDICINE | Admitting: INTERNAL MEDICINE
Payer: COMMERCIAL

## 2020-10-12 VITALS
TEMPERATURE: 98 F | HEIGHT: 64 IN | HEART RATE: 63 BPM | RESPIRATION RATE: 11 BRPM | OXYGEN SATURATION: 99 % | SYSTOLIC BLOOD PRESSURE: 104 MMHG | WEIGHT: 156 LBS | DIASTOLIC BLOOD PRESSURE: 57 MMHG | BODY MASS INDEX: 26.63 KG/M2

## 2020-10-12 DIAGNOSIS — Z45.02 ICD (IMPLANTABLE CARDIOVERTER-DEFIBRILLATOR) BATTERY DEPLETION: Primary | ICD-10-CM

## 2020-10-12 PROCEDURE — 0JPT0PZ REMOVAL OF CARDIAC RHYTHM RELATED DEVICE FROM TRUNK SUBCUTANEOUS TISSUE AND FASCIA, OPEN APPROACH: ICD-10-PCS | Performed by: INTERNAL MEDICINE

## 2020-10-12 PROCEDURE — 93005 ELECTROCARDIOGRAM TRACING: CPT

## 2020-10-12 PROCEDURE — 0JH608Z INSERTION OF DEFIBRILLATOR GENERATOR INTO CHEST SUBCUTANEOUS TISSUE AND FASCIA, OPEN APPROACH: ICD-10-PCS | Performed by: INTERNAL MEDICINE

## 2020-10-12 PROCEDURE — 99153 MOD SED SAME PHYS/QHP EA: CPT

## 2020-10-12 PROCEDURE — 93010 ELECTROCARDIOGRAM REPORT: CPT | Performed by: INTERNAL MEDICINE

## 2020-10-12 PROCEDURE — 33262 RMVL& REPLC PULSE GEN 1 LEAD: CPT

## 2020-10-12 PROCEDURE — 99152 MOD SED SAME PHYS/QHP 5/>YRS: CPT

## 2020-10-12 PROCEDURE — 93640 EP EVAL 1/2CHMBR PACG CVDFB: CPT

## 2020-10-12 RX ORDER — CHLORHEXIDINE GLUCONATE 4 G/100ML
30 SOLUTION TOPICAL
Status: DISCONTINUED | OUTPATIENT
Start: 2020-10-13 | End: 2020-10-12 | Stop reason: HOSPADM

## 2020-10-12 RX ORDER — BACITRACIN 50000 [USP'U]/1
INJECTION, POWDER, LYOPHILIZED, FOR SOLUTION INTRAMUSCULAR
Status: COMPLETED
Start: 2020-10-12 | End: 2020-10-12

## 2020-10-12 RX ORDER — MIDAZOLAM HYDROCHLORIDE 1 MG/ML
INJECTION INTRAMUSCULAR; INTRAVENOUS
Status: COMPLETED
Start: 2020-10-12 | End: 2020-10-12

## 2020-10-12 RX ORDER — CEFAZOLIN SODIUM/WATER 2 G/20 ML
SYRINGE (ML) INTRAVENOUS
Status: COMPLETED
Start: 2020-10-12 | End: 2020-10-12

## 2020-10-12 RX ORDER — LIDOCAINE HYDROCHLORIDE 10 MG/ML
INJECTION, SOLUTION EPIDURAL; INFILTRATION; INTRACAUDAL; PERINEURAL
Status: COMPLETED
Start: 2020-10-12 | End: 2020-10-12

## 2020-10-12 RX ORDER — CEFAZOLIN SODIUM/WATER 2 G/20 ML
2 SYRINGE (ML) INTRAVENOUS
Status: DISCONTINUED | OUTPATIENT
Start: 2020-10-12 | End: 2020-10-12 | Stop reason: HOSPADM

## 2020-10-12 RX ORDER — SODIUM CHLORIDE 9 MG/ML
INJECTION, SOLUTION INTRAVENOUS
Status: DISCONTINUED | OUTPATIENT
Start: 2020-10-13 | End: 2020-10-12 | Stop reason: HOSPADM

## 2020-10-12 RX ADMIN — SODIUM CHLORIDE: 9 INJECTION, SOLUTION INTRAVENOUS at 13:15:00

## 2020-10-12 NOTE — PROGRESS NOTES
Patient received from cath lab  At  s/p SCICD Gen change , left chest dressing  site soft, no hematoma, dressing C/D/I. Pulses intact. Hemodynamics stable. Post-op orders received and implemented.  Patient and family educated on flat bedrest, verbaliz

## 2020-10-12 NOTE — H&P
HPI:    Irving Galicia is a 64year old female who presents for follow-up of ARVD, HTN, alcoholic cardiomyopathy (EF normalized in 2018) and HL.     She was diagnosed with ARVD in 2004. She had a Medtronic defibrillator implanted at that time.  She had a depressive disorder 4/3/2013   • Osteoarthritis       right hip bone spurs   • Osteopenia     • Other and unspecified hyperlipidemia     • Pap smear for cervical cancer screening 6/2011     wnl   • PONV (postoperative nausea and vomiting)     • Seizure dis by Alexa Whitaker., Kalina Rayo MD at [facility] CVOR   • TONSILLECTOMY         child   • XR DEXA BONE DENSITY AXIAL (CPT=77080)   6.25.2008     osteopenia, hip             Family History   Adopted: Yes   Problem Relation Age of Onset   • Allergies Daughter     • Asthma per week.  42.5 g 3   • Fluticasone Propionate (FLONASE) 50 MCG/ACT Nasal Suspension 2 sprays by Nasal route daily.         • ALBUTEROL SULFATE (2.5 MG/3ML) 0.083% Inhalation Nebu Soln USE 1 AMPULE IN NEBULIZER EVERY SIX HOURS  75 mL 1   • Spacer/Aero-Holdi Date Value   05/20/2014 47   03/02/2013 50   02/13/2012 44 (L)          Direct HDL (mg/dL)   Date Value   12/03/2018 51   05/08/2018 54   02/02/2018 58          LDL CHOLESTROL (mg/dL)   Date Value   05/20/2014 96   03/02/2013 80   02/13/2012 82

## 2020-10-12 NOTE — PROGRESS NOTES
Patient received from cath lab s/p SCICD Gen change, left chest  site soft, no hematoma, dressing C/D/I. Pulses intact. Hemodynamics stable. Post-op orders received and implemented. Patient and family educated on bedrest, verbalize understanding.

## 2020-10-13 RX ORDER — MIRABEGRON 50 MG/1
TABLET, FILM COATED, EXTENDED RELEASE ORAL
Qty: 60 TABLET | Refills: 3 | Status: SHIPPED | OUTPATIENT
Start: 2020-10-13 | End: 2021-07-08

## 2020-10-13 NOTE — PROCEDURES
[facility]    PATIENT'S NAME: Swaledale Gudenisse   ATTENDING PHYSICIAN: Jose Rucker M.D. OPERATING PHYSICIAN: Jose Rucker M.D.    PATIENT ACCOUNT#:   [de-identified]    LOCATION:  [address] [place] [address] Dr LEUNG [facility] 3 EDWP  MEDICAL RECORD #:   [id number]       ALAYNA place.    The lead was tested through the device. R-waves were 14, pacing impedance 456, pacing threshold was 1.75 V at 0.5 ms.  VF was induced with shock on T.   It was appropriately sensed at 1.2 mV with just 1 dropout and successfully defibrillated with

## 2021-03-18 DIAGNOSIS — Z23 NEED FOR VACCINATION: ICD-10-CM

## 2021-04-26 PROBLEM — D69.2 PURPURA (HCC): Status: ACTIVE | Noted: 2021-04-26

## 2021-04-26 PROBLEM — D69.2 PURPURA: Status: ACTIVE | Noted: 2021-04-26

## 2021-07-08 RX ORDER — MIRABEGRON 50 MG/1
TABLET, FILM COATED, EXTENDED RELEASE ORAL
Qty: 60 TABLET | Refills: 0 | OUTPATIENT
Start: 2021-07-08

## 2021-07-08 RX ORDER — MIRABEGRON 50 MG/1
1 TABLET, FILM COATED, EXTENDED RELEASE ORAL DAILY
Qty: 60 TABLET | Refills: 3 | Status: SHIPPED | OUTPATIENT
Start: 2021-07-08

## 2021-07-08 NOTE — TELEPHONE ENCOUNTER
Pt called and reminded she needed a yearly appt w/ Dr Radha Rocha. Last visit was May 2019.  Pt called back and made an appt for Aug.

## 2021-07-08 NOTE — TELEPHONE ENCOUNTER
LM for pt on  regarding refill request for myrbetriq. Last visit was over 2 yrs ago. Pt will need to make an appt for any further refills. Request a callback.

## 2021-08-06 ENCOUNTER — OFFICE VISIT (OUTPATIENT)
Dept: UROLOGY | Facility: HOSPITAL | Age: 61
End: 2021-08-06
Attending: OBSTETRICS & GYNECOLOGY
Payer: COMMERCIAL

## 2021-08-06 VITALS — WEIGHT: 176 LBS | HEIGHT: 64 IN | TEMPERATURE: 98 F | BODY MASS INDEX: 30.05 KG/M2

## 2021-08-06 DIAGNOSIS — N95.2 POSTMENOPAUSAL ATROPHIC VAGINITIS: ICD-10-CM

## 2021-08-06 DIAGNOSIS — N32.81 OAB (OVERACTIVE BLADDER): Primary | ICD-10-CM

## 2021-08-06 PROCEDURE — 99212 OFFICE O/P EST SF 10 MIN: CPT

## 2021-08-06 RX ORDER — TROSPIUM CHLORIDE ER 60 MG/1
60 CAPSULE ORAL DAILY
Qty: 30 CAPSULE | Refills: 1 | Status: SHIPPED | OUTPATIENT
Start: 2021-08-06 | End: 2021-08-24

## 2021-08-09 ENCOUNTER — TELEPHONE (OUTPATIENT)
Dept: UROLOGY | Facility: HOSPITAL | Age: 61
End: 2021-08-09

## 2021-08-09 NOTE — TELEPHONE ENCOUNTER
Patient calling, wanted to verify she was to take the Trospium 60 mg and also the Mybetriq 50 mg both daily, verified with patient Dr. Rona Sharpe wanted her to try taking both to see if it helped with her leakage, patient verbalizing understanding, will take bot

## 2021-08-24 ENCOUNTER — TELEPHONE (OUTPATIENT)
Dept: UROLOGY | Facility: HOSPITAL | Age: 61
End: 2021-08-24

## 2021-08-24 RX ORDER — TROSPIUM CHLORIDE ER 60 MG/1
60 CAPSULE ORAL DAILY
Qty: 30 CAPSULE | Refills: 5 | Status: SHIPPED | OUTPATIENT
Start: 2021-08-24

## 2021-08-24 NOTE — TELEPHONE ENCOUNTER
Patient called, feels the Mybetriq and Trospium are really helping, is up maybe once at night, often, none, stilling voiding about every 2 hours, leakage much better, less, does have some dry mouth but is manageable, wants to continue taking both medicati

## 2021-10-29 ENCOUNTER — APPOINTMENT (OUTPATIENT)
Dept: GENERAL RADIOLOGY | Facility: HOSPITAL | Age: 61
End: 2021-10-29
Payer: COMMERCIAL

## 2021-10-29 ENCOUNTER — HOSPITAL ENCOUNTER (EMERGENCY)
Facility: HOSPITAL | Age: 61
Discharge: HOME | End: 2021-10-29
Payer: COMMERCIAL

## 2021-10-29 VITALS
TEMPERATURE: 97 F | HEART RATE: 74 BPM | DIASTOLIC BLOOD PRESSURE: 73 MMHG | WEIGHT: 170 LBS | SYSTOLIC BLOOD PRESSURE: 126 MMHG | BODY MASS INDEX: 29.02 KG/M2 | OXYGEN SATURATION: 95 % | RESPIRATION RATE: 16 BRPM | HEIGHT: 64 IN

## 2021-10-29 DIAGNOSIS — T14.8XXA WOUND INFECTION: Primary | ICD-10-CM

## 2021-10-29 DIAGNOSIS — L08.9 WOUND INFECTION: Primary | ICD-10-CM

## 2021-10-29 PROCEDURE — 73562 X-RAY EXAM OF KNEE 3: CPT

## 2021-10-29 PROCEDURE — 99283 EMERGENCY DEPT VISIT LOW MDM: CPT

## 2021-10-29 RX ORDER — DOXYCYCLINE 100 MG/1
100 CAPSULE ORAL 2 TIMES DAILY
Qty: 14 CAPSULE | Refills: 0 | Status: SHIPPED | OUTPATIENT
Start: 2021-10-29 | End: 2021-11-05

## 2021-10-30 NOTE — ED PROVIDER NOTES
Patient Seen in: [facility] Emergency Department      History   Patient presents with:  Rash Skin Problem    Stated Complaint: fell and now has redness and swelling to left knee where it has scabbed over     Subjective:   HPI    61-year-old female p related - defibritaltor put in    • Sleep apnea     CPAP   • Visual impairment     contacts/glasses              Past Surgical History:   Procedure Laterality Date   • CARDIAC DEFIBRILLATOR PLACEMENT  2011    Medtronic single chamber ICD   • COLONOSCOPY  1 years: 28        Types: Cigarettes        Start date: 1979        Quit date: 2014        Years since quittin.1      Smokeless tobacco: Never Used      Tobacco comment: smokes e cigarette now, quiting/at least 30 pack hx per md note    Vaping Technologist)                 CONCLUSION:  No acute fracture or dislocation. No significant knee effusion. There is mild patellofemoral compartment narrowing.    Dictated by (CST): Chary Sharif MD on 10/29/2021 at 8:59 PM     Finalized by (CST): Dane

## 2021-10-30 NOTE — ED INITIAL ASSESSMENT (HPI)
Pt fell on the 18th in [facility] and injured her left knee. Pt wants to be sure her knee is not infected because she is going to [place] next week.

## 2022-01-05 ENCOUNTER — TELEPHONE (OUTPATIENT)
Dept: UROLOGY | Facility: HOSPITAL | Age: 62
End: 2022-01-05

## 2022-01-05 DIAGNOSIS — N32.81 OAB (OVERACTIVE BLADDER): Primary | ICD-10-CM

## 2022-01-05 RX ORDER — MIRABEGRON 50 MG/1
50 TABLET, FILM COATED, EXTENDED RELEASE ORAL DAILY
Qty: 30 TABLET | Refills: 3 | Status: SHIPPED | OUTPATIENT
Start: 2022-01-05 | End: 2022-02-04

## 2022-01-05 NOTE — TELEPHONE ENCOUNTER
New prescription through [facility] was going through local pharmacy. TORB Dr Torin Cornell Myrbetriq 50mg Q day Dispense #30 with 3 refills.   Last visit 8/6/21

## 2022-03-29 RX ORDER — TROSPIUM CHLORIDE ER 60 MG/1
CAPSULE ORAL
Qty: 90 CAPSULE | Refills: 3 | Status: SHIPPED | OUTPATIENT
Start: 2022-03-29

## 2022-03-29 NOTE — TELEPHONE ENCOUNTER
LM for pt on  regarding refill of trospium 60mg. Request pt to callback and verfiy local pharmacy vs [facility] and whether she wants a 30d or 90 d supply.

## 2022-03-29 NOTE — TELEPHONE ENCOUNTER
Pt phoned RN line back and states she wants a 90d supply of trospium 60mg and would like it to go to [place]. Pt taking both trospium and Myrbetriq per Dr Eliel Otto.

## 2022-04-12 ENCOUNTER — HOSPITAL ENCOUNTER (EMERGENCY)
Facility: HOSPITAL | Age: 62
Discharge: HOME | End: 2022-04-12
Attending: EMERGENCY MEDICINE
Payer: COMMERCIAL

## 2022-04-12 ENCOUNTER — APPOINTMENT (OUTPATIENT)
Dept: CT IMAGING | Facility: HOSPITAL | Age: 62
End: 2022-04-12
Attending: EMERGENCY MEDICINE
Payer: COMMERCIAL

## 2022-04-12 VITALS
SYSTOLIC BLOOD PRESSURE: 111 MMHG | OXYGEN SATURATION: 91 % | HEART RATE: 83 BPM | BODY MASS INDEX: 30.73 KG/M2 | DIASTOLIC BLOOD PRESSURE: 54 MMHG | HEIGHT: 64 IN | TEMPERATURE: 98 F | RESPIRATION RATE: 15 BRPM | WEIGHT: 180 LBS

## 2022-04-12 DIAGNOSIS — K52.9 ENTEROCOLITIS: ICD-10-CM

## 2022-04-12 DIAGNOSIS — K62.5 RECTAL BLEEDING: ICD-10-CM

## 2022-04-12 DIAGNOSIS — R19.7 DIARRHEA, UNSPECIFIED TYPE: ICD-10-CM

## 2022-04-12 DIAGNOSIS — K64.4 EXTERNAL HEMORRHOID: ICD-10-CM

## 2022-04-12 DIAGNOSIS — F43.9 STRESS: ICD-10-CM

## 2022-04-12 DIAGNOSIS — D72.829 LEUKOCYTOSIS, UNSPECIFIED TYPE: ICD-10-CM

## 2022-04-12 DIAGNOSIS — R10.13 EPIGASTRIC PAIN: Primary | ICD-10-CM

## 2022-04-12 LAB
ALBUMIN SERPL-MCNC: 3.7 G/DL (ref 3.4–5)
ALBUMIN/GLOB SERPL: 1.1 {RATIO} (ref 1–2)
ALP LIVER SERPL-CCNC: 91 U/L
ALT SERPL-CCNC: 31 U/L
ANION GAP SERPL CALC-SCNC: 4 MMOL/L (ref 0–18)
AST SERPL-CCNC: 19 U/L (ref 15–37)
BASOPHILS # BLD AUTO: 0.05 X10(3) UL (ref 0–0.2)
BASOPHILS NFR BLD AUTO: 0.3 %
BILIRUB SERPL-MCNC: 0.5 MG/DL (ref 0.1–2)
BUN BLD-MCNC: 17 MG/DL (ref 7–18)
CALCIUM BLD-MCNC: 9.4 MG/DL (ref 8.5–10.1)
CHLORIDE SERPL-SCNC: 104 MMOL/L (ref 98–112)
CO2 SERPL-SCNC: 28 MMOL/L (ref 21–32)
CREAT BLD-MCNC: 0.94 MG/DL
EOSINOPHIL # BLD AUTO: 0.09 X10(3) UL (ref 0–0.7)
EOSINOPHIL NFR BLD AUTO: 0.5 %
ERYTHROCYTE [DISTWIDTH] IN BLOOD BY AUTOMATED COUNT: 13.2 %
GLOBULIN PLAS-MCNC: 3.3 G/DL (ref 2.8–4.4)
GLUCOSE BLD-MCNC: 131 MG/DL (ref 70–99)
HCT VFR BLD AUTO: 40.3 %
HGB BLD-MCNC: 13.3 G/DL
IMM GRANULOCYTES # BLD AUTO: 0.08 X10(3) UL (ref 0–1)
IMM GRANULOCYTES NFR BLD: 0.4 %
LIPASE SERPL-CCNC: 53 U/L (ref 73–393)
LYMPHOCYTES # BLD AUTO: 1.18 X10(3) UL (ref 1–4)
LYMPHOCYTES NFR BLD AUTO: 6.4 %
MCH RBC QN AUTO: 29.2 PG (ref 26–34)
MCHC RBC AUTO-ENTMCNC: 33 G/DL (ref 31–37)
MCV RBC AUTO: 88.4 FL
MONOCYTES # BLD AUTO: 1.76 X10(3) UL (ref 0.1–1)
MONOCYTES NFR BLD AUTO: 9.6 %
NEUTROPHILS # BLD AUTO: 15.16 X10 (3) UL (ref 1.5–7.7)
NEUTROPHILS # BLD AUTO: 15.16 X10(3) UL (ref 1.5–7.7)
NEUTROPHILS NFR BLD AUTO: 82.8 %
OSMOLALITY SERPL CALC.SUM OF ELEC: 285 MOSM/KG (ref 275–295)
PLATELET # BLD AUTO: 304 10(3)UL (ref 150–450)
POTASSIUM SERPL-SCNC: 3.9 MMOL/L (ref 3.5–5.1)
PROT SERPL-MCNC: 7 G/DL (ref 6.4–8.2)
RBC # BLD AUTO: 4.56 X10(6)UL
SODIUM SERPL-SCNC: 136 MMOL/L (ref 136–145)
WBC # BLD AUTO: 18.3 X10(3) UL (ref 4–11)

## 2022-04-12 PROCEDURE — S0028 INJECTION, FAMOTIDINE, 20 MG: HCPCS | Performed by: EMERGENCY MEDICINE

## 2022-04-12 PROCEDURE — 96375 TX/PRO/DX INJ NEW DRUG ADDON: CPT | Performed by: EMERGENCY MEDICINE

## 2022-04-12 PROCEDURE — 83690 ASSAY OF LIPASE: CPT | Performed by: EMERGENCY MEDICINE

## 2022-04-12 PROCEDURE — 85025 COMPLETE CBC W/AUTO DIFF WBC: CPT | Performed by: EMERGENCY MEDICINE

## 2022-04-12 PROCEDURE — 74177 CT ABD & PELVIS W/CONTRAST: CPT | Performed by: EMERGENCY MEDICINE

## 2022-04-12 PROCEDURE — 80053 COMPREHEN METABOLIC PANEL: CPT | Performed by: EMERGENCY MEDICINE

## 2022-04-12 PROCEDURE — 96361 HYDRATE IV INFUSION ADD-ON: CPT | Performed by: EMERGENCY MEDICINE

## 2022-04-12 PROCEDURE — 96374 THER/PROPH/DIAG INJ IV PUSH: CPT | Performed by: EMERGENCY MEDICINE

## 2022-04-12 PROCEDURE — 99284 EMERGENCY DEPT VISIT MOD MDM: CPT | Performed by: EMERGENCY MEDICINE

## 2022-04-12 RX ORDER — ONDANSETRON 4 MG/1
4 TABLET, ORALLY DISINTEGRATING ORAL EVERY 8 HOURS PRN
Qty: 10 TABLET | Refills: 0 | Status: SHIPPED | OUTPATIENT
Start: 2022-04-12 | End: 2022-04-22

## 2022-04-12 RX ORDER — ONDANSETRON 2 MG/ML
4 INJECTION INTRAMUSCULAR; INTRAVENOUS ONCE
Status: COMPLETED | OUTPATIENT
Start: 2022-04-12 | End: 2022-04-12

## 2022-04-12 RX ORDER — LORAZEPAM 2 MG/ML
1 INJECTION INTRAMUSCULAR ONCE
Status: COMPLETED | OUTPATIENT
Start: 2022-04-12 | End: 2022-04-12

## 2022-04-12 RX ORDER — HYDROCORTISONE 25 MG/G
1 CREAM TOPICAL 2 TIMES DAILY
Qty: 28 G | Refills: 2 | Status: SHIPPED | OUTPATIENT
Start: 2022-04-12

## 2022-04-12 RX ORDER — IOHEXOL 350 MG/ML
100 INJECTION, SOLUTION INTRAVENOUS
Status: COMPLETED | OUTPATIENT
Start: 2022-04-12 | End: 2022-04-12

## 2022-04-12 RX ORDER — LOPERAMIDE HYDROCHLORIDE 2 MG/1
2 TABLET ORAL AS NEEDED
Qty: 20 TABLET | Refills: 0 | Status: SHIPPED | OUTPATIENT
Start: 2022-04-12 | End: 2022-05-12

## 2022-04-12 RX ORDER — ACETAMINOPHEN 500 MG
1000 TABLET ORAL ONCE
Status: COMPLETED | OUTPATIENT
Start: 2022-04-12 | End: 2022-04-12

## 2022-04-12 RX ORDER — FAMOTIDINE 10 MG/ML
20 INJECTION, SOLUTION INTRAVENOUS ONCE
Status: COMPLETED | OUTPATIENT
Start: 2022-04-12 | End: 2022-04-12

## 2022-04-12 RX ORDER — MORPHINE SULFATE 2 MG/ML
2 INJECTION, SOLUTION INTRAMUSCULAR; INTRAVENOUS ONCE
Status: COMPLETED | OUTPATIENT
Start: 2022-04-12 | End: 2022-04-12

## 2022-04-12 NOTE — ED INITIAL ASSESSMENT (HPI)
Patient here with c/o general abdominal pain along with 10 episodes of diarrhea, nausea and headache. Patient reports she has been taking pepto and advil.   Denies fever

## 2022-06-06 ENCOUNTER — TELEPHONE (OUTPATIENT)
Dept: UROLOGY | Facility: CLINIC | Age: 62
End: 2022-06-06

## 2022-06-06 RX ORDER — ESTRADIOL 0.1 MG/G
CREAM VAGINAL
Qty: 42.5 G | Refills: 3 | Status: SHIPPED | OUTPATIENT
Start: 2022-06-06

## 2022-06-06 RX ORDER — MIRABEGRON 50 MG/1
50 TABLET, FILM COATED, EXTENDED RELEASE ORAL DAILY
Qty: 90 TABLET | Refills: 3 | Status: SHIPPED | OUTPATIENT
Start: 2022-06-06 | End: 2022-07-06

## 2022-06-06 NOTE — TELEPHONE ENCOUNTER
Pt called saying her insurance and pharmacy have changed. Needing a refill of myrbetriq 50mg. Pt reports she is taking trospium 60mg and using estrace. Could use more estrace as well. Remind pt she needs yearly in August. Will have  schedule.

## 2022-08-08 ENCOUNTER — OFFICE VISIT (OUTPATIENT)
Dept: UROLOGY | Facility: CLINIC | Age: 62
End: 2022-08-08
Attending: OBSTETRICS & GYNECOLOGY
Payer: COMMERCIAL

## 2022-08-08 VITALS — BODY MASS INDEX: 30.73 KG/M2 | HEIGHT: 64 IN | WEIGHT: 180 LBS | TEMPERATURE: 98 F

## 2022-08-08 DIAGNOSIS — N95.2 POSTMENOPAUSAL ATROPHIC VAGINITIS: ICD-10-CM

## 2022-08-08 DIAGNOSIS — N32.81 OAB (OVERACTIVE BLADDER): Primary | ICD-10-CM

## 2022-08-08 PROCEDURE — 99212 OFFICE O/P EST SF 10 MIN: CPT

## 2022-08-08 RX ORDER — MIRABEGRON 50 MG/1
50 TABLET, FILM COATED, EXTENDED RELEASE ORAL DAILY
Qty: 90 TABLET | Refills: 3 | Status: SHIPPED | OUTPATIENT
Start: 2022-08-08 | End: 2022-11-06

## 2022-08-08 RX ORDER — MIRABEGRON 50 MG/1
TABLET, FILM COATED, EXTENDED RELEASE ORAL
COMMUNITY
End: 2022-08-08

## 2023-01-29 DIAGNOSIS — N32.81 OAB (OVERACTIVE BLADDER): ICD-10-CM

## 2023-01-31 RX ORDER — MIRABEGRON 50 MG/1
50 TABLET, FILM COATED, EXTENDED RELEASE ORAL DAILY
Qty: 90 TABLET | Refills: 3 | Status: SHIPPED | OUTPATIENT
Start: 2023-01-31

## 2023-03-20 RX ORDER — TROSPIUM CHLORIDE ER 60 MG/1
CAPSULE ORAL
Qty: 90 CAPSULE | Refills: 3 | Status: SHIPPED | OUTPATIENT
Start: 2023-03-20

## 2023-06-27 ENCOUNTER — HOSPITAL ENCOUNTER (INPATIENT)
Facility: HOSPITAL | Age: 63
LOS: 3 days | Discharge: HOME | End: 2023-06-30
Attending: INTERNAL MEDICINE | Admitting: INTERNAL MEDICINE
Payer: COMMERCIAL

## 2023-06-27 ENCOUNTER — HOSPITAL ENCOUNTER (OUTPATIENT)
Age: 63
Discharge: ANOTHER HOSPITAL | End: 2023-06-27
Payer: COMMERCIAL

## 2023-06-27 ENCOUNTER — APPOINTMENT (OUTPATIENT)
Dept: CT IMAGING | Age: 63
End: 2023-06-27
Attending: PHYSICIAN ASSISTANT
Payer: COMMERCIAL

## 2023-06-27 ENCOUNTER — HOSPITAL ENCOUNTER (OUTPATIENT)
Facility: HOSPITAL | Age: 63
Setting detail: OBSERVATION
Discharge: HOME | DRG: 394 | End: 2023-06-30
Attending: INTERNAL MEDICINE | Admitting: INTERNAL MEDICINE
Payer: COMMERCIAL

## 2023-06-27 VITALS
WEIGHT: 191 LBS | OXYGEN SATURATION: 98 % | DIASTOLIC BLOOD PRESSURE: 47 MMHG | SYSTOLIC BLOOD PRESSURE: 113 MMHG | TEMPERATURE: 99 F | RESPIRATION RATE: 18 BRPM | HEIGHT: 63 IN | BODY MASS INDEX: 33.84 KG/M2 | HEART RATE: 90 BPM

## 2023-06-27 DIAGNOSIS — K52.9 ACUTE COLITIS: Primary | ICD-10-CM

## 2023-06-27 LAB
#MXD IC: 1.6 X10ˆ3/UL (ref 0.1–1)
BUN BLD-MCNC: 19 MG/DL (ref 7–18)
CHLORIDE BLD-SCNC: 102 MMOL/L (ref 98–112)
CO2 BLD-SCNC: 26 MMOL/L (ref 21–32)
CREAT BLD-MCNC: 0.8 MG/DL
GFR SERPLBLD BASED ON 1.73 SQ M-ARVRAT: 84 ML/MIN/1.73M2 (ref 60–?)
GLUCOSE BLD-MCNC: 136 MG/DL (ref 70–99)
HCT VFR BLD AUTO: 44.3 %
HCT VFR BLD CALC: 45 %
HGB BLD-MCNC: 14.2 G/DL
ISTAT IONIZED CALCIUM FOR CHEM 8: 1.15 MMOL/L (ref 1.12–1.32)
LACTATE SERPL-SCNC: 1.3 MMOL/L (ref 0.4–2)
LIPASE SERPL-CCNC: 19 U/L (ref 13–75)
LYMPHOCYTES # BLD AUTO: 0.8 X10ˆ3/UL (ref 1–4)
LYMPHOCYTES NFR BLD AUTO: 3.8 %
MCH RBC QN AUTO: 28.3 PG (ref 26–34)
MCHC RBC AUTO-ENTMCNC: 32.1 G/DL (ref 31–37)
MCV RBC AUTO: 88.2 FL (ref 80–100)
MIXED CELL %: 7.3 %
NEUTROPHILS # BLD AUTO: 19.7 X10ˆ3/UL (ref 1.5–7.7)
NEUTROPHILS NFR BLD AUTO: 88.9 %
PLATELET # BLD AUTO: 320 X10ˆ3/UL (ref 150–450)
POCT BILIRUBIN URINE: NEGATIVE
POCT GLUCOSE URINE: NEGATIVE MG/DL
POCT KETONE URINE: NEGATIVE MG/DL
POCT LEUKOCYTE ESTERASE URINE: NEGATIVE
POCT NITRITE URINE: POSITIVE
POCT PH URINE: 5.5 (ref 5–8)
POCT PROTEIN URINE: NEGATIVE MG/DL
POCT SPECIFIC GRAVITY URINE: 1.02
POCT URINE CLARITY: CLEAR
POCT UROBILINOGEN URINE: 0.2 MG/DL
POTASSIUM BLD-SCNC: 4.4 MMOL/L (ref 3.6–5.1)
RBC # BLD AUTO: 5.02 X10ˆ6/UL
SODIUM BLD-SCNC: 139 MMOL/L (ref 136–145)
WBC # BLD AUTO: 22.1 X10ˆ3/UL (ref 4–11)

## 2023-06-27 PROCEDURE — 74177 CT ABD & PELVIS W/CONTRAST: CPT | Performed by: PHYSICIAN ASSISTANT

## 2023-06-27 PROCEDURE — 99215 OFFICE O/P EST HI 40 MIN: CPT

## 2023-06-27 PROCEDURE — 94660 CPAP INITIATION&MGMT: CPT

## 2023-06-27 PROCEDURE — 96375 TX/PRO/DX INJ NEW DRUG ADDON: CPT

## 2023-06-27 PROCEDURE — 83605 ASSAY OF LACTIC ACID: CPT | Performed by: INTERNAL MEDICINE

## 2023-06-27 PROCEDURE — 83690 ASSAY OF LIPASE: CPT | Performed by: PHYSICIAN ASSISTANT

## 2023-06-27 PROCEDURE — 80047 BASIC METABLC PNL IONIZED CA: CPT

## 2023-06-27 PROCEDURE — 96361 HYDRATE IV INFUSION ADD-ON: CPT

## 2023-06-27 PROCEDURE — 85025 COMPLETE CBC W/AUTO DIFF WBC: CPT | Performed by: PHYSICIAN ASSISTANT

## 2023-06-27 PROCEDURE — 81002 URINALYSIS NONAUTO W/O SCOPE: CPT | Performed by: PHYSICIAN ASSISTANT

## 2023-06-27 PROCEDURE — 96374 THER/PROPH/DIAG INJ IV PUSH: CPT

## 2023-06-27 RX ORDER — MORPHINE SULFATE 4 MG/ML
4 INJECTION, SOLUTION INTRAMUSCULAR; INTRAVENOUS EVERY 2 HOUR PRN
Status: DISCONTINUED | OUTPATIENT
Start: 2023-06-27 | End: 2023-06-30

## 2023-06-27 RX ORDER — BUPROPION HYDROCHLORIDE 300 MG/1
300 TABLET ORAL DAILY
Status: DISCONTINUED | OUTPATIENT
Start: 2023-06-28 | End: 2023-06-30

## 2023-06-27 RX ORDER — KETOROLAC TROMETHAMINE 15 MG/ML
15 INJECTION, SOLUTION INTRAMUSCULAR; INTRAVENOUS ONCE
Status: COMPLETED | OUTPATIENT
Start: 2023-06-27 | End: 2023-06-27

## 2023-06-27 RX ORDER — ONDANSETRON 2 MG/ML
4 INJECTION INTRAMUSCULAR; INTRAVENOUS EVERY 6 HOURS PRN
Status: DISCONTINUED | OUTPATIENT
Start: 2023-06-27 | End: 2023-06-30

## 2023-06-27 RX ORDER — ESCITALOPRAM OXALATE 20 MG/1
20 TABLET ORAL DAILY
Status: DISCONTINUED | OUTPATIENT
Start: 2023-06-28 | End: 2023-06-30

## 2023-06-27 RX ORDER — METOPROLOL SUCCINATE 100 MG/1
200 TABLET, EXTENDED RELEASE ORAL DAILY
Status: DISCONTINUED | OUTPATIENT
Start: 2023-06-28 | End: 2023-06-30

## 2023-06-27 RX ORDER — MONTELUKAST SODIUM 10 MG/1
10 TABLET ORAL EVERY EVENING
Status: DISCONTINUED | OUTPATIENT
Start: 2023-06-27 | End: 2023-06-30

## 2023-06-27 RX ORDER — FLUTICASONE PROPIONATE 50 MCG
2 SPRAY, SUSPENSION (ML) NASAL DAILY
Status: DISCONTINUED | OUTPATIENT
Start: 2023-06-27 | End: 2023-06-30

## 2023-06-27 RX ORDER — ATORVASTATIN CALCIUM 10 MG/1
10 TABLET, FILM COATED ORAL EVERY EVENING
Status: DISCONTINUED | OUTPATIENT
Start: 2023-06-27 | End: 2023-06-30

## 2023-06-27 RX ORDER — ACETAMINOPHEN 500 MG
500 TABLET ORAL EVERY 4 HOURS PRN
Status: DISCONTINUED | OUTPATIENT
Start: 2023-06-27 | End: 2023-06-28

## 2023-06-27 RX ORDER — MIRABEGRON 50 MG/1
50 TABLET, EXTENDED RELEASE ORAL DAILY
Status: DISCONTINUED | OUTPATIENT
Start: 2023-06-28 | End: 2023-06-30

## 2023-06-27 RX ORDER — MORPHINE SULFATE 2 MG/ML
1 INJECTION, SOLUTION INTRAMUSCULAR; INTRAVENOUS EVERY 2 HOUR PRN
Status: DISCONTINUED | OUTPATIENT
Start: 2023-06-27 | End: 2023-06-30

## 2023-06-27 RX ORDER — OXYBUTYNIN CHLORIDE 5 MG/1
5 TABLET, EXTENDED RELEASE ORAL DAILY
Status: DISCONTINUED | OUTPATIENT
Start: 2023-06-28 | End: 2023-06-28

## 2023-06-27 RX ORDER — ALBUTEROL SULFATE 90 UG/1
2 AEROSOL, METERED RESPIRATORY (INHALATION) EVERY 6 HOURS PRN
Status: DISCONTINUED | OUTPATIENT
Start: 2023-06-27 | End: 2023-06-30

## 2023-06-27 RX ORDER — MORPHINE SULFATE 2 MG/ML
2 INJECTION, SOLUTION INTRAMUSCULAR; INTRAVENOUS EVERY 2 HOUR PRN
Status: DISCONTINUED | OUTPATIENT
Start: 2023-06-27 | End: 2023-06-30

## 2023-06-27 RX ORDER — PROCHLORPERAZINE EDISYLATE 5 MG/ML
5 INJECTION INTRAMUSCULAR; INTRAVENOUS EVERY 8 HOURS PRN
Status: DISCONTINUED | OUTPATIENT
Start: 2023-06-27 | End: 2023-06-30

## 2023-06-27 RX ORDER — ONDANSETRON 2 MG/ML
4 INJECTION INTRAMUSCULAR; INTRAVENOUS ONCE
Status: COMPLETED | OUTPATIENT
Start: 2023-06-27 | End: 2023-06-27

## 2023-06-27 RX ORDER — SODIUM CHLORIDE 9 MG/ML
1000 INJECTION, SOLUTION INTRAVENOUS ONCE
Status: COMPLETED | OUTPATIENT
Start: 2023-06-27 | End: 2023-06-27

## 2023-06-27 RX ORDER — SODIUM CHLORIDE 9 MG/ML
INJECTION, SOLUTION INTRAVENOUS CONTINUOUS
Status: DISCONTINUED | OUTPATIENT
Start: 2023-06-27 | End: 2023-06-30

## 2023-06-27 NOTE — ED INITIAL ASSESSMENT (HPI)
C/o left lower abd pain since 1am. Pt report she had n/v/d. Pt reports 5 vomiting episode and 4 diarrhea episodes. Pt reports last meal last night. Pt reports cramping. Pt reports hx of colitis. Pt reports otc meds for symptoms with some relief.

## 2023-06-27 NOTE — CONSULTS
[facility]  Report of Consultation    Oumou Ralph Patient Status:  Inpatient    11/3/1960 MRN [id number]   [facility] 3NW-A Attending Citlali Ba MD   Hosp Day # 0 PCP Bernardo Chappell MD     Reason for Consultation:  colitis    Oumou Ralph is a a(n) 63year old female.  at bedside    Cscope 2017 w/ Dr Carolyn Falcon had diverticulosis    Hx of etoh CM and has pm/icd but states improved after etoh cessation many years ago. States never fired for several years. Echo  w/ improved LV (see report). Has occ GERD, denies chronic gi symptoms    But 2022 had similar event, ct w/ changes of colitis (see report)    Doing well until yesterday, mult episodes of non bloody n/v/d and lower abd cramping, symptoms of n/v/d resolved early this morning, came to urgent care and wbc of ~ 22k and CT w/ left sided colitis from spelic flex and descending, diveticulosis noted as well as other findings    Denies f/c/s, recent abx, current n/v, rashes, chest pain, sob    States aside from chronic arthritis, ROS otherwise stable      Risk of colonoscopy including prep, sedation, bleeding, perforation, infection, miss rate or issues with accuracy, etc and possible morbidity/mortalty discussed. We discussed risk, benefit, alternatives, limitations as well as not having the procedures. All questions answered, they demonstrated understanding. Patient Active Problem List:     Asthma with COPD (chronic obstructive pulmonary disease) ([address]     Allergic rhinitis due to other allergen     Major depressive disorder     Tobacco use disorder     Cardiomyopathy ([address]     Status post lumbar surgery     Arrhythmogenic right ventricular dysplasia (HCC)     Obesity (BMI 30-39. 9)     Chronic obstructive pulmonary disease, unspecified COPD type ([address]     INDRA on CPAP     ICD (implantable cardioverter-defibrillator) Medtronic     Primary osteoarthritis of right hip     Purpura ([address]        History:  Past Medical History:   Diagnosis Date    Alcohol abuse     history    Alcoholic cardiomyopathy ([facility]     Anxiety state, unspecified     Asthma     Back problem     spinal fusion in     Cardiomyopathy     had defibrillator in  for hx of ARVD, no further episodes since then per pt                   Cervical dysplasia     Chronic rhinitis     COPD (chronic obstructive pulmonary disease) (HCC)     No O2    Depression     Extrinsic asthma, unspecified     moderate persistent    Fibrocystic breast disease     Fibroids     Ganglion, unspecified 3/31/2014    H/O mammogram 5--    benign    H/O pregnancy 1984  1986        High blood pressure     High cholesterol     History of suburethral sling procedure 2016    Major depressive disorder 4/3/2013    Osteoarthritis     right hip bone spurs    Osteopenia     Other and unspecified hyperlipidemia     Pap smear for cervical cancer screening 2011    wnl    PONV (postoperative nausea and vomiting)     Seizure disorder ([facility]     one seizure that was heart related - defibritaltor put in     Sleep apnea     CPAP    Visual impairment     contacts/glasses       Past Surgical History:   Procedure Laterality Date    CARDIAC DEFIBRILLATOR PLACEMENT      Medtronic single chamber ICD    COLONOSCOPY  10/18/17= Diverticulosis    Repeat     COLONOSCOPY N/A 10/18/2017    Procedure: COLONOSCOPY;  Surgeon: Vikash Schmidt MD;  Location: [address] [place] [address] ENDOSCOPY    COLPOSCOPY, CERVIX W/UPPER ADJACENT VAGINA; W/BIOPSY(S), CERVIX      CRYOCAUTERY OF CERVIX  1985    CECE    DEXA BONE DENSITY AXIAL (CPT=77080)  6.25.2008    osteopenia, hip    FLUOR GID & Ross Daring NDL/CATH SPI DX/THER NJX N/A 7/3/2014    Procedure: LUMBAR EPIDURAL;  Surgeon: Dami Abreu DO;  Location: [address] [place] [address]    HIP REPLACEMENT SURGERY      HYSTERECTOMY  2012    erin S & O    INJECTION PROC FOR SACROILIAC JOINT ARTHROGRAPHY &/OR ANESTHETIC/STEROID Bilateral 2014    Procedure: SI JOINT INJECTION;  Surgeon: Ken Davis DO;  Location: [facility]    INJECTION, W/WO CONTRAST, DX/THERAPEUTIC SUBSTANCE, EPIDURAL/SUBARACHNOID; LUMBAR/SACRAL N/A 7/3/2014    Procedure: LUMBAR EPIDURAL;  Surgeon: Ken Davis DO;  Location: [address] [place] [address]    M-SEDAJ BY  PHYS [address]  N SVC 5+ YR N/A 7/3/2014    Procedure: LUMBAR EPIDURAL;  Surgeon: Ken Davis DO;  Location: [address] [place] [address] BIOPSY STEREO NODULE 2 SITE BILAT (CPT=19081/07650)  2009    gera    MARY LOCALIZATION WIRE 1 SITE LEFT (CPT=19281)  2011    benign    NEEDLE BIOPSY LEFT  2012? benign per pt    REMOVAL OF LUNG,LOBECTOMY  01/2019     VATS, right upper lobe wedge resection, mediastinal lymph node dissection, benign nodule    SPINE SURGERY PROCEDURE UNLISTED      Lumbar Spinal Fusion    TONSILLECTOMY      child       Family History   Adopted: Yes   Problem Relation Age of Onset    Allergies Daughter     Asthma Daughter     Asthma Son         reports that she quit smoking about 8 years ago. Her smoking use included cigarettes. She started smoking about 44 years ago. She has a 35.00 pack-year smoking history. She has never used smokeless tobacco. She reports that she does not currently use alcohol. She reports that she does not use drugs. Allergies:    Levaquin                HIVES  Aminoglycosides         HIVES  Ciprofloxacin           HIVES  Ditropan [Oxybutyni*    DIZZINESS, OTHER (SEE COMMENTS)    Comment:Pt c/o HA  Other                       Comment:Mold, hayfever, horses, cats-nasal symptoms, itchy             eyes    Medications:  No current facility-administered medications for this encounter.       [COMPLETED] sodium chloride 0.9% infusion 1,000 mL, 1,000 mL, Intravenous, Once, Santy Whitten PA-C, Stopped at 06/27/23 1350  [COMPLETED] ondansetron (Zofran) 4 MG/2ML injection 4 mg, 4 mg, Intravenous, Once, Santy Whitten PA-C, 4 mg at 06/27/23 1148  [COMPLETED] ketorolac (Toradol) 15 MG/ML injection 15 mg, 15 mg, Intravenous, Once, Sarah Cosby PA-C, 15 mg at 06/27/23 1209  [COMPLETED] iopamidol 76% (ISOVUE-370) injection for power injector, 85 mL, Intravenous, ONCE PRN, Sarah Cosby PA-C, 85 mL at 06/27/23 1230    TROSPIUM CHLORIDE ER 60 MG Oral Capsule SR 24 Hr, TAKE 1 CAPSULE BY MOUTH EVERY DAY, Disp: 90 capsule, Rfl: 3  Mirabegron ER (MYRBETRIQ) 50 MG Oral Tablet 24 Hr, Take 50 mg by mouth daily. , Disp: 90 tablet, Rfl: 3  estradiol (ESTRACE) 0.1 MG/GM Vaginal Cream, Apply 1 gram vaginally 3 times per week., Disp: 42.5 g, Rfl: 3  witch hazel-glycerin External Pads, Place 1 Application rectally as needed for Pain or Hemmorrhoids. , Disp: 30 each, Rfl: 3  hydrocortisone 2.5 % External Cream, Place 1 Application rectally 2 (two) times daily. , Disp: 28 g, Rfl: 2  albuterol 108 (90 Base) MCG/ACT Inhalation Aero Soln, Inhale 2 puffs into the lungs every 6 (six) hours as needed for Wheezing., Disp: 3 each, Rfl: 2  fluticasone-umeclidin-vilant (TRELEGY ELLIPTA) 100-62.5-25 MCG/INH Inhalation Aerosol Powder, Breath Activated, Inhale 1 puff into the lungs daily. , Disp: 3 each, Rfl: 2  escitalopram 20 MG Oral Tab, Take 1 tablet (20 mg total) by mouth daily. , Disp: 90 tablet, Rfl: 1  buPROPion 300 MG Oral Tablet 24 Hr, Take 1 tablet (300 mg total) by mouth daily. , Disp: 90 tablet, Rfl: 1  montelukast 10 MG Oral Tab, Take 1 tablet (10 mg total) by mouth every evening., Disp: 90 tablet, Rfl: 1  METOPROLOL SUCCINATE  MG Oral Tablet 24 Hr, TAKE 1 TABLET BY MOUTH ONE TIME A DAY, Disp: 90 tablet, Rfl: 3  ATORVASTATIN 10 MG Oral Tab, TAKE 1 TABLET BY MOUTH IN THE EVENING , Disp: 90 tablet, Rfl: 3  ramipril 5 MG Oral Cap, Take 1 capsule (5 mg total) by mouth daily. , Disp: 90 capsule, Rfl: 3  Estradiol 0.1 MG/GM Vaginal Cream, Place 1 g vaginally 3 (three) times a week., Disp: 42.5 g, Rfl: 3  cyclobenzaprine 5 MG Oral Tab, Take 1 tablet (5 mg total) by mouth 3 (three) times daily as needed for Muscle spasms. (Patient not taking: Reported on 11/12/2021), Disp: 45 tablet, Rfl: 3  Fluticasone Propionate 50 MCG/ACT Nasal Suspension, 2 sprays by Nasal route daily. , Disp: , Rfl:   ALBUTEROL SULFATE (2.5 MG/3ML) 0.083% Inhalation Nebu Soln, USE 1 AMPULE IN NEBULIZER EVERY SIX HOURS , Disp: 75 mL, Rfl: 1  Spacer/Aero-Holding Chambers (AEROCHAMBER MV) Does not apply Misc, use with inhaler, Disp: 1 Each, Rfl: 0          Review of Systems:  GENERAL HEALTH: otherwise feels well, no change in energy level  SKIN: denies any unusual skin lesions or rashes  EYES: denies new visual complaints or deficits  HEENT: denies new nasal congestion, states has had chronic sinus infx in the past and has been on augmentin  RESPIRATORY: denies new/changing shortness of breath    CARDIOVASCULAR: denies chest pain or SWANSON; no palpitations   GI: as above  GENITAL//GYN: denies acute change in frequency   MUSCULOSKELETAL: denies new joint issues  NEURO: denies new sensory or motor complaint  PSYCHE: mood is unchanged a except as stated above  HEMATOLOGY: denies bruising or excessive bleeding except as stated  ENDOCRINE: denies unexpected wt gain or wt loss except as stated  ALLERGY/IMM. :medication allergies as above        PHYSICAL EXAM   LMP 06/28/2012     GENERAL: well developed, well nourished, in no apparent distress, non toxic appearing  HEENT: normocephalic, mucous membranes moist.  EYES: eomi   NECK:non tender, supple  RESPIRATORY: clear to percussion and auscultation  CARDIOVASCULAR: reg rate    ABDOMEN: normal, active bowel sounds, soft, nondistended, no G/R/R but mild tender w/ deep palp. Bowel sounds are w/in nl, but quiet. No bruits  EXTREMITIES: no le edema  NEURO:  Oriented x 3   BACK: no CVA tenderness  PSYCH: appropriate for the exam          LAB/IMAGING RESULTS:        No results for input(s): GLU, BUN, CREATSERUM, GFRAA, GFRNAA, CA, NA, K, CL, CO2 in the last 168 hours.     Recent Labs   Lab 06/27/23  1152 MCV 88.2   MCHC 32.1       No results for input(s): ALKPHO, BILT, TP, ALB, ALT, AST in the last 168 hours. Invalid input(s): BILIDIR    No results for input(s): RAZ, LIP in the last 168 hours. PATIENT NAME: Anai Marquez  DATE OF OPERATION: 10/18/2017     PREOPERATIVE DIAGNOSIS:  CRC screening  POSTOPERATIVE DIAGNOSIS:  Diverticulosis, mild     PROCEDURE PERFORMED: Colonoscopy with conscious sedation  SURGEON: Yovana Gomez MD   MEDICATIONS: Fentanyl 100 mcg IV and Versed 6 mg IV in divided doses under the supervision of Dr. Sukhdev Gomez. PROCEDURE AND FINDINGS: The patient was placed into the left lateral decubitus position after informed consent was obtained. All questions were answered. An ASA score was assigned, Mallampati score 1. IV sedation was administered. A rectal exam was performed which was normal.  The Olympus video colonoscope was then introduced through the rectum and advanced through the colon to the cecum. The quality of prep was excellent, adequate, Aronchick 1. The cecum was identified by the ileocecal valve and appendiceal orifice. The colonoscope was then withdrawn and the mucosa was further carefully inspected. There were a few diverticula noted in the sigmoid colon. The remainder of the entire examined colon was otherwise normal.  After retroflexion in the rectum, the colonoscope was straightened and removed and the procedure was completed. The patient tolerated the procedure well. There were no implants placed nor significant blood loss. Total moderate sedation time was 28 minutes. There were no immediate apparent complications. RECOMMENDATIONS   Consume a high fiber diet. Repeat colonoscopy in 10 years. 9/2/21 Echo  Conclusions     Summary:     1. Left ventricle: The cavity size is normal. Wall thickness is normal.      Systolic function is normal. The estimated ejection fraction is 55-60%.       Wall motion is normal; there are no regional wall motion abnormalities. Left ventricular diastolic function parameters are normal for the      patient's age. 2. Right ventricle: The cavity size is normal. Wall thickness is normal.      Systolic function is normal. Right ventricular systolic pressure could      not be estimated. 3. Pericardium, extracardiac: There is no significant pericardial effusion. Prepared and signed by   Rema Rain MD   09/02/2021 14:37       Narrative   PROCEDURE:  CT ABDOMEN PELVIS IV CONTRAST, NO ORAL (ER)     COMPARISON:  None. INDICATIONS:  ABD PAIN, NAUSEA/DIARRHEA     TECHNIQUE:  CT scanning was performed from the dome of the diaphragm to the pubic symphysis with non-ionic intravenous contrast material. Post contrast coronal MPR imaging was performed. Dose reduction techniques were used. Dose information is  transmitted to the [facility]) NRDR ([address]) which includes the Dose Index Registry. PATIENT STATED HISTORY:(As transcribed by Technologist)  Abdominal pain in all four quadrants with nausea and diarrhea. CONTRAST USED:  100cc of Omnipaque 350     FINDINGS:    LIVER:  No enlargement, atrophy, abnormal density, or significant focal lesion. BILIARY:  No visible dilatation or calcification. PANCREAS:  No lesion, fluid collection, ductal dilatation, or atrophy. SPLEEN:  No enlargement or focal lesion. KIDNEYS:  Right superior renal 2.2 cm cyst.  Within the mid left kidney posteriorly is a 1.2 cm fat density mass is most consistent with an angiomyolipoma. No obstruction or calcification. ADRENALS:  No mass or enlargement. AORTA/VASCULAR:  Atherosclerosis. No aneurysm or dissection. RETROPERITONEUM:  No mass or adenopathy. BOWEL/MESENTERY:  Fluid within visualized distal esophagus which demonstrates diffuse wall thickening may be due to reflux or reactive from vomiting. Normal caliber small and large bowel including the appendix. Bowel wall thickening involves the  descending colon extending to the mid sigmoid colon with trace pericolonic inflammatory changes. Several fluid-filled small bowel loops are present. Findings suggest enterocolitis. ABDOMINAL WALL:  Small umbilical fat containing hernia. URINARY BLADDER:  No visible focal wall thickening, lesion, or calculus. PELVIC NODES:  No adenopathy. PELVIC ORGANS:  Surgical removal of the uterus and both ovaries. No free fluid. BONES:  Degenerative change. Postsurgical changes with hardware involve the lower lumbar spine. Right total hip prosthesis. Hardware causes beam hardening artifact limiting evaluation of the surrounding area. No fracture. LUNG BASES:  No visible pulmonary or pleural disease. OTHER:  Partially imaged cardiac pacemaker device. Impression   CONCLUSION:    1. Findings as detailed above are most consistent with enterocolitis. MILLICENT LOZADA notified of these findings with preliminary radiology report from [facility] services. Dictated by (CST): Yaya Fagan MD on 4/12/2022 at 7:33 AM            Narrative   PROCEDURE:  CT ABDOMEN+PELVIS (CONTRAST ONLY) (CPT=74177)     COMPARISON:  EDSRAVANI , CT, CT ABDOMEN PELVIS IV CONTRAST, NO ORAL (ER), 4/12/2022, 4:25 AM.     INDICATIONS:  vomiting , and diarrhea     TECHNIQUE:  CT scanning was performed from the dome of the diaphragm to the pubic symphysis with non-ionic intravenous contrast material. Post contrast coronal MPR imaging was performed. Dose reduction techniques were used. Dose information is  transmitted to the [facility]) NRDR ([address]) which includes the Dose Index Registry. PATIENT STATED HISTORY:(As transcribed by Technologist)  Patient states to have left lower quadrant pain, vomitting and diarrhea since this morning. CONTRAST USED:  85cc of Isovue 370     FINDINGS:    LUNG BASES:  Dependent atelectasis. Minimal ground-glass opacity within the right middle lobe. Coronary artery calcifications. Small hiatal hernia. LIVER:  Normal in shape and contour. Mild hepatic steatosis. BILIARY:  Gallbladder is unremarkable in appearance. No intrahepatic biliary dilatation. .  SPLEEN:  Normal.  No enlargement or focal lesion. PANCREAS:  No villa-pancreatic inflammatory stranding  ADRENALS:  Normal.  No mass or enlargement. KIDNEYS:  Kidneys are symmetrical in size without evidence of hydronephrosis. Right renal cyst.  Based on imaging characteristics, no further imaging follow-up of the cyst(s) is needed secondary to benign appearance there is a left renal angiomyolipoma  measuring 1.4 x 1.3 cm which previously measured 1.2 x 1.2 cm. BOWEL/MESENTERY:  Bowel is normal in caliber. No evidence of obstruction. There is wall thickening of the descending portion and splenic flexure of the colon. Colonic diverticulosis. PELVIS:  Bladder is unremarkable in appearance. Calcified phleboliths within the pelvis. Hysterectomy. No free pelvic fluid. AORTA/VASCULAR:  Atheromatous calcifications of the aorta. BONES:  Right hip arthroplasty. Degenerative changes of the lumbar spine. Posterior fusion of L4-L5. Impression   CONCLUSION:    1. Wall thickening of the splenic flexure and descending portion of the colon suggestive of colitis. 2. Colonic diverticulosis. 3. Mild hepatic steatosis. 4. Minimal increase in left renal angiomyolipoma. 5. Minimal ground-glass opacity within the right middle lobe which may represent area of atelectasis or scarring but developing pneumonia can not be completely excluded. LOCATION:  [place]        Dictated by (CST): Tameka Montenegro MD on 6/27/2023 at 12:45 PM               ASSESSMENT AND PLAN:   1 colitis  2 abnl ct of the colon   3 alcohol CM, pm, icd    Findings concerning for ischemic colitis. Infx colitis possible.  Start of a more chronic process possible as well    Appears comfortable, symptoms already improving over the course of today but wbc is 22k, occult cdiff possible as well    Role of endoscopy to eval colitis reviewed as well as risk, benefit, limits. She did not want to consider lower endoscopy for now unless worsens    Multiple options for how to proceed were discussed in detail with patient, the patient was agreeable to the following plan . ..  --stool cdiff and pcr panel  --check lactic acid  --ivf  --clears  --she is agreeable to empiric abx for now          they demonstrated understanding of risks of morbidity/mortality if diagnoses and/or treatments were delayed balanced against risks of further investigation and/or treatment. --they demonstrated understanding of the results and recommendations and options and the risk/benefit/limitations and alternatives to the plan.   All of their questions and concerns were addressed and were agreeable to the plan as listed      Diya Jay MD  6/27/2023  4:00 PM

## 2023-06-27 NOTE — PROGRESS NOTES
NURSING ADMISSION NOTE      Patient admitted via Wheelchair  Oriented to room. Safety precautions initiated. Bed in low position. Call light in reach. Upon assessment, A&Ox4. RA.  WDL. NSR on Tele. SCDs. Tolerating clears. Abdomen soft, hypoactive bowel sounds. Voids. Up ad saravanan. IVF & IV ABX infusing. Denies SOB, CP, lightheadedness, and N/V at this time. Pain managed per MAR. POC discussed, all questions answered and concerns addressed. Safety precautions in place. Frequent rounds performed.

## 2023-06-27 NOTE — ED QUICK NOTES
Pt IV wrapped. Pt and pt  directed to go to ER to check in for her room. Pt instructed to avoid eating and drinking until plan of care is determined. Pt verbalized understand of instructions.

## 2023-06-27 NOTE — CM/SW NOTE
Kaya Mcclure MD: Joyce Bailey in by: Dr. Steph Edgar  Call back #: [id number]  Date of admission: 6/27/2023  Diagnosis: ischemic colitis  Specialist MD:  Holly Kinsey  Time of admission: 1445  Type of bed: surgical  Time of admission: [facility] Verification complete: yes

## 2023-06-28 LAB
ADENOVIRUS F 40/41 PCR: NEGATIVE
ANION GAP SERPL CALC-SCNC: 3 MMOL/L (ref 0–18)
ASTROVIRUS PCR: NEGATIVE
BASOPHILS # BLD AUTO: 0.04 X10(3) UL (ref 0–0.2)
BASOPHILS NFR BLD AUTO: 0.2 %
BUN BLD-MCNC: 13 MG/DL (ref 7–18)
C CAYETANENSIS DNA SPEC QL NAA+PROBE: NEGATIVE
C DIFF TOX B STL QL: NEGATIVE
CALCIUM BLD-MCNC: 7.6 MG/DL (ref 8.5–10.1)
CAMPY SP DNA.DIARRHEA STL QL NAA+PROBE: NEGATIVE
CHLORIDE SERPL-SCNC: 110 MMOL/L (ref 98–112)
CO2 SERPL-SCNC: 23 MMOL/L (ref 21–32)
CREAT BLD-MCNC: 0.78 MG/DL
CRYPTOSP DNA SPEC QL NAA+PROBE: NEGATIVE
EAEC PAA PLAS AGGR+AATA ST NAA+NON-PRB: NEGATIVE
EC STX1+STX2 + H7 FLIC SPEC NAA+PROBE: NEGATIVE
ENTAMOEBA HISTOLYTICA PCR: NEGATIVE
EOSINOPHIL # BLD AUTO: 0.05 X10(3) UL (ref 0–0.7)
EOSINOPHIL NFR BLD AUTO: 0.3 %
EPEC EAE GENE STL QL NAA+NON-PROBE: NEGATIVE
ERYTHROCYTE [DISTWIDTH] IN BLOOD BY AUTOMATED COUNT: 13.9 %
ETEC LTA+ST1A+ST1B TOX ST NAA+NON-PROBE: NEGATIVE
GFR SERPLBLD BASED ON 1.73 SQ M-ARVRAT: 87 ML/MIN/1.73M2 (ref 60–?)
GIARDIA LAMBLIA PCR: NEGATIVE
GLUCOSE BLD-MCNC: 128 MG/DL (ref 70–99)
HCT VFR BLD AUTO: 34.6 %
HGB BLD-MCNC: 11.2 G/DL
HGB BLD-MCNC: 11.4 G/DL
HGB BLD-MCNC: 11.5 G/DL
IMM GRANULOCYTES # BLD AUTO: 0.16 X10(3) UL (ref 0–1)
IMM GRANULOCYTES NFR BLD: 0.9 %
LYMPHOCYTES # BLD AUTO: 2 X10(3) UL (ref 1–4)
LYMPHOCYTES NFR BLD AUTO: 11.5 %
MCH RBC QN AUTO: 28.8 PG (ref 26–34)
MCHC RBC AUTO-ENTMCNC: 33.2 G/DL (ref 31–37)
MCV RBC AUTO: 86.7 FL
MONOCYTES # BLD AUTO: 1.51 X10(3) UL (ref 0.1–1)
MONOCYTES NFR BLD AUTO: 8.7 %
NEUTROPHILS # BLD AUTO: 13.69 X10 (3) UL (ref 1.5–7.7)
NEUTROPHILS # BLD AUTO: 13.69 X10(3) UL (ref 1.5–7.7)
NEUTROPHILS NFR BLD AUTO: 78.4 %
NOROVIRUS GI/GII PCR: NEGATIVE
OSMOLALITY SERPL CALC.SUM OF ELEC: 284 MOSM/KG (ref 275–295)
P SHIGELLOIDES DNA STL QL NAA+PROBE: NEGATIVE
PLATELET # BLD AUTO: 271 10(3)UL (ref 150–450)
POTASSIUM SERPL-SCNC: 3.3 MMOL/L (ref 3.5–5.1)
POTASSIUM SERPL-SCNC: 4.2 MMOL/L (ref 3.5–5.1)
RBC # BLD AUTO: 3.99 X10(6)UL
ROTAVIRUS A PCR: NEGATIVE
SALMONELLA DNA SPEC QL NAA+PROBE: NEGATIVE
SAPOVIRUS PCR: NEGATIVE
SHIGELLA SP+EIEC IPAH ST NAA+NON-PROBE: NEGATIVE
SODIUM SERPL-SCNC: 136 MMOL/L (ref 136–145)
V CHOLERAE DNA SPEC QL NAA+PROBE: NEGATIVE
VIBRIO DNA SPEC NAA+PROBE: NEGATIVE
WBC # BLD AUTO: 17.5 X10(3) UL (ref 4–11)
YERSINIA DNA SPEC NAA+PROBE: NEGATIVE

## 2023-06-28 PROCEDURE — 94799 UNLISTED PULMONARY SVC/PX: CPT

## 2023-06-28 PROCEDURE — 80048 BASIC METABOLIC PNL TOTAL CA: CPT | Performed by: INTERNAL MEDICINE

## 2023-06-28 PROCEDURE — 84132 ASSAY OF SERUM POTASSIUM: CPT | Performed by: INTERNAL MEDICINE

## 2023-06-28 PROCEDURE — 87493 C DIFF AMPLIFIED PROBE: CPT | Performed by: INTERNAL MEDICINE

## 2023-06-28 PROCEDURE — 87507 IADNA-DNA/RNA PROBE TQ 12-25: CPT | Performed by: INTERNAL MEDICINE

## 2023-06-28 PROCEDURE — 85018 HEMOGLOBIN: CPT | Performed by: INTERNAL MEDICINE

## 2023-06-28 PROCEDURE — 85025 COMPLETE CBC W/AUTO DIFF WBC: CPT | Performed by: INTERNAL MEDICINE

## 2023-06-28 RX ORDER — POTASSIUM CHLORIDE 20 MEQ/1
40 TABLET, EXTENDED RELEASE ORAL EVERY 4 HOURS
Status: COMPLETED | OUTPATIENT
Start: 2023-06-28 | End: 2023-06-28

## 2023-06-28 RX ORDER — ACETAMINOPHEN 500 MG
1000 TABLET ORAL EVERY 6 HOURS PRN
Status: DISCONTINUED | OUTPATIENT
Start: 2023-06-28 | End: 2023-06-30

## 2023-06-28 NOTE — PLAN OF CARE
A&Ox4. VSS. RA. . Denies chest pain and SOB. Telemetry: NSR. GI: Abdomen soft, nondistended. Denies passing gas but had BM yesterday morning. Zofran given for nausea  : Voids. Pain controlled with PRN pain medications. Up ad saravanan in room   Diet: Tolerating clears, no red or orange food per doc   IVF running per order with IV abx  All appropriate safety measures in place. All questions and concerns addressed.

## 2023-06-29 LAB
ANION GAP SERPL CALC-SCNC: 4 MMOL/L (ref 0–18)
BASOPHILS # BLD AUTO: 0.05 X10(3) UL (ref 0–0.2)
BASOPHILS NFR BLD AUTO: 0.3 %
BUN BLD-MCNC: 6 MG/DL (ref 7–18)
CALCIUM BLD-MCNC: 8.2 MG/DL (ref 8.5–10.1)
CHLORIDE SERPL-SCNC: 112 MMOL/L (ref 98–112)
CO2 SERPL-SCNC: 23 MMOL/L (ref 21–32)
CREAT BLD-MCNC: 0.62 MG/DL
EOSINOPHIL # BLD AUTO: 0.19 X10(3) UL (ref 0–0.7)
EOSINOPHIL NFR BLD AUTO: 1.3 %
ERYTHROCYTE [DISTWIDTH] IN BLOOD BY AUTOMATED COUNT: 13.6 %
GFR SERPLBLD BASED ON 1.73 SQ M-ARVRAT: 102 ML/MIN/1.73M2 (ref 60–?)
GLUCOSE BLD-MCNC: 107 MG/DL (ref 70–99)
HCT VFR BLD AUTO: 35.4 %
HGB BLD-MCNC: 10.7 G/DL
HGB BLD-MCNC: 10.9 G/DL
HGB BLD-MCNC: 11.6 G/DL
HGB BLD-MCNC: 11.6 G/DL
IMM GRANULOCYTES # BLD AUTO: 0.09 X10(3) UL (ref 0–1)
IMM GRANULOCYTES NFR BLD: 0.6 %
LYMPHOCYTES # BLD AUTO: 1.47 X10(3) UL (ref 1–4)
LYMPHOCYTES NFR BLD AUTO: 10.2 %
MCH RBC QN AUTO: 29 PG (ref 26–34)
MCHC RBC AUTO-ENTMCNC: 32.8 G/DL (ref 31–37)
MCV RBC AUTO: 88.5 FL
MONOCYTES # BLD AUTO: 1.14 X10(3) UL (ref 0.1–1)
MONOCYTES NFR BLD AUTO: 7.9 %
NEUTROPHILS # BLD AUTO: 11.41 X10 (3) UL (ref 1.5–7.7)
NEUTROPHILS # BLD AUTO: 11.41 X10(3) UL (ref 1.5–7.7)
NEUTROPHILS NFR BLD AUTO: 79.7 %
OSMOLALITY SERPL CALC.SUM OF ELEC: 286 MOSM/KG (ref 275–295)
PLATELET # BLD AUTO: 275 10(3)UL (ref 150–450)
POTASSIUM SERPL-SCNC: 3.5 MMOL/L (ref 3.5–5.1)
RBC # BLD AUTO: 4 X10(6)UL
SODIUM SERPL-SCNC: 139 MMOL/L (ref 136–145)
WBC # BLD AUTO: 14.4 X10(3) UL (ref 4–11)

## 2023-06-29 PROCEDURE — 94799 UNLISTED PULMONARY SVC/PX: CPT

## 2023-06-29 PROCEDURE — 85025 COMPLETE CBC W/AUTO DIFF WBC: CPT | Performed by: INTERNAL MEDICINE

## 2023-06-29 PROCEDURE — 80048 BASIC METABOLIC PNL TOTAL CA: CPT | Performed by: INTERNAL MEDICINE

## 2023-06-29 PROCEDURE — 85018 HEMOGLOBIN: CPT | Performed by: INTERNAL MEDICINE

## 2023-06-29 NOTE — PLAN OF CARE
Pt VSS, Aox4. Pt reports pain in abdomen, specifically left lower abdomen and she also has  a mild headache. Abdomen soft. Pt pain managed with PRN meds per MAR & heatpacks. Up ad saravanan, steady gait. Pt is on room air; denies ZULLY/SOB/lightheadendess. INDRA/CPAP at night for bedtime. CDIFF negative, Stool panel negative, reports having a few bloody Bms in the day. Q8 hgb labs will be continued. Tele continued; NSR on monitor. Fall & safety precautions in place. IVF continued per orders. POC discussed.

## 2023-06-29 NOTE — PLAN OF CARE
Problem: PAIN - ADULT  Goal: Verbalizes/displays adequate comfort level or patient's stated pain goal  Description: INTERVENTIONS:  - Encourage pt to monitor pain and request assistance  - Assess pain using appropriate pain scale  - Administer analgesics based on type and severity of pain and evaluate response  - Implement non-pharmacological measures as appropriate and evaluate response  - Consider cultural and social influences on pain and pain management  - Manage/alleviate anxiety  - Utilize distraction and/or relaxation techniques  - Monitor for opioid side effects  - Notify MD/LIP if interventions unsuccessful or patient reports new pain  - Anticipate increased pain with activity and pre-medicate as appropriate  Outcome: Progressing     Problem: RISK FOR INFECTION - ADULT  Goal: Absence of fever/infection during anticipated neutropenic period  Description: INTERVENTIONS  - Monitor WBC  - Administer growth factors as ordered  - Implement neutropenic guidelines  Outcome: Progressing     Problem: SAFETY ADULT - FALL  Goal: Free from fall injury  Description: INTERVENTIONS:  - Assess pt frequently for physical needs  - Identify cognitive and physical deficits and behaviors that affect risk of falls.   - [place] fall precautions as indicated by assessment.  - Educate pt/family on patient safety including physical limitations  - Instruct pt to call for assistance with activity based on assessment  - Modify environment to reduce risk of injury  - Provide assistive devices as appropriate  - Consider OT/PT consult to assist with strengthening/mobility  - Encourage toileting schedule  Outcome: Progressing     Problem: DISCHARGE PLANNING  Goal: Discharge to home or other facility with appropriate resources  Description: INTERVENTIONS:  - Identify barriers to discharge w/pt and caregiver  - Include patient/family/discharge partner in discharge planning  - Arrange for needed discharge resources and transportation as appropriate  - Identify discharge learning needs (meds, wound care, etc)  - Arrange for interpreters to assist at discharge as needed  - Consider post-discharge preferences of patient/family/discharge partner  - Complete POLST form as appropriate  - Assess patient's ability to be responsible for managing their own health  - Refer to Case Management Department for coordinating discharge planning if the patient needs post-hospital services based on physician/LIP order or complex needs related to functional status, cognitive ability or social support system  Outcome: Progressing   Alert and orient x 4 , on room air , vitals stable , tolerated clear liquid diet well . Abdomen soft , bs hypo , passing gas . Up in room . Plan of care discussed , answered all questions . Verbalized understanding .  Will continue to monitor

## 2023-06-30 VITALS
OXYGEN SATURATION: 92 % | BODY MASS INDEX: 34 KG/M2 | TEMPERATURE: 99 F | SYSTOLIC BLOOD PRESSURE: 99 MMHG | WEIGHT: 191 LBS | DIASTOLIC BLOOD PRESSURE: 48 MMHG | RESPIRATION RATE: 18 BRPM | HEART RATE: 72 BPM

## 2023-06-30 LAB
ANION GAP SERPL CALC-SCNC: 5 MMOL/L (ref 0–18)
BASOPHILS # BLD AUTO: 0.05 X10(3) UL (ref 0–0.2)
BASOPHILS NFR BLD AUTO: 0.4 %
BUN BLD-MCNC: 6 MG/DL (ref 7–18)
CALCIUM BLD-MCNC: 8.3 MG/DL (ref 8.5–10.1)
CHLORIDE SERPL-SCNC: 113 MMOL/L (ref 98–112)
CO2 SERPL-SCNC: 24 MMOL/L (ref 21–32)
CREAT BLD-MCNC: 0.66 MG/DL
EOSINOPHIL # BLD AUTO: 0.22 X10(3) UL (ref 0–0.7)
EOSINOPHIL NFR BLD AUTO: 2 %
ERYTHROCYTE [DISTWIDTH] IN BLOOD BY AUTOMATED COUNT: 13.5 %
GFR SERPLBLD BASED ON 1.73 SQ M-ARVRAT: 100 ML/MIN/1.73M2 (ref 60–?)
GLUCOSE BLD-MCNC: 106 MG/DL (ref 70–99)
HCT VFR BLD AUTO: 33.8 %
HGB BLD-MCNC: 10.6 G/DL
HGB BLD-MCNC: 10.9 G/DL
IMM GRANULOCYTES # BLD AUTO: 0.09 X10(3) UL (ref 0–1)
IMM GRANULOCYTES NFR BLD: 0.8 %
LYMPHOCYTES # BLD AUTO: 1.29 X10(3) UL (ref 1–4)
LYMPHOCYTES NFR BLD AUTO: 11.5 %
MCH RBC QN AUTO: 28.7 PG (ref 26–34)
MCHC RBC AUTO-ENTMCNC: 32.2 G/DL (ref 31–37)
MCV RBC AUTO: 88.9 FL
MONOCYTES # BLD AUTO: 1.16 X10(3) UL (ref 0.1–1)
MONOCYTES NFR BLD AUTO: 10.3 %
NEUTROPHILS # BLD AUTO: 8.45 X10 (3) UL (ref 1.5–7.7)
NEUTROPHILS # BLD AUTO: 8.45 X10(3) UL (ref 1.5–7.7)
NEUTROPHILS NFR BLD AUTO: 75 %
OSMOLALITY SERPL CALC.SUM OF ELEC: 292 MOSM/KG (ref 275–295)
PLATELET # BLD AUTO: 285 10(3)UL (ref 150–450)
POTASSIUM SERPL-SCNC: 3.3 MMOL/L (ref 3.5–5.1)
RBC # BLD AUTO: 3.8 X10(6)UL
SODIUM SERPL-SCNC: 142 MMOL/L (ref 136–145)
WBC # BLD AUTO: 11.3 X10(3) UL (ref 4–11)

## 2023-06-30 PROCEDURE — 85018 HEMOGLOBIN: CPT | Performed by: INTERNAL MEDICINE

## 2023-06-30 PROCEDURE — 80048 BASIC METABOLIC PNL TOTAL CA: CPT | Performed by: INTERNAL MEDICINE

## 2023-06-30 PROCEDURE — 85025 COMPLETE CBC W/AUTO DIFF WBC: CPT | Performed by: INTERNAL MEDICINE

## 2023-06-30 RX ORDER — ONDANSETRON 4 MG/1
4 TABLET, ORALLY DISINTEGRATING ORAL EVERY 4 HOURS PRN
Qty: 5 TABLET | Refills: 0 | Status: SHIPPED | OUTPATIENT
Start: 2023-06-30 | End: 2023-09-19 | Stop reason: ALTCHOICE

## 2023-06-30 NOTE — PROGRESS NOTES
NURSING DISCHARGE NOTE    Discharged Home via Wheelchair. Accompanied by Support staff  Belongings Taken by patient/family. IV removed. Patient educated on all AVS discharge information. Pt instructed to make follow-up appointments. All questions answered. Patients home med returned to patient. Patient given MD note. Patient brought down to [employer] with all belongings to be driven home by spouse.

## 2023-06-30 NOTE — PROGRESS NOTES
Pt is alert and oriented x4. On room air. Denies shortness of breath or difficulty in breathing. Tolerating diet. Abdomen soft and nontender. IV saline locked. Up ambulating in halls. VSS. Passing gas and belching. Bowel sounds active. Voiding without difficulty. Updated on plan of care. Call light within reach.

## 2023-06-30 NOTE — DISCHARGE INSTRUCTIONS
Follow up with Primary Care Doctor or Cardiology as outpatient in the next 1-2 weeks to evaluate blood pressure to determine when and if to resume ramipril. Check blood pressure at home daily and if systolic blood pressure (top number) greater then 140 then resume ramipril.

## 2023-07-24 NOTE — PLAN OF CARE
2140: Pt VSS, Aox4. Pt reports mild cramping in abdomen, managed with PO tylenol so far this evening. Up ad saravanan, steady gait. Pt is on room air; denies ZULLY/SOB/lightheadendess. INDRA/CPAP at night for bedtime. CDIFF negative, Stool panel negative. 1 Bm this evening black/brown/loose. Q8 hgb labs will be continued. Tele continued; NSR on monitor. Fall & safety precautions in place. Tolerates full liquid diet, attempting crackers. IVF continued per orders. POC discussed. 2240: Pt tolerates crackers + full liquids. No pain, no nausea.  Will have soft diet for breakfast. What Type Of Note Output Would You Prefer (Optional)?: Standard Output Hpi Title: Evaluation of Skin Lesions How Severe Are Your Spot(S)?: moderate Have Your Spot(S) Been Treated In The Past?: has not been treated

## 2023-09-19 RX ORDER — OMEPRAZOLE 20 MG/1
20 CAPSULE, DELAYED RELEASE ORAL
COMMUNITY

## 2023-10-10 ENCOUNTER — ANESTHESIA (OUTPATIENT)
Dept: ENDOSCOPY | Facility: HOSPITAL | Age: 63
End: 2023-10-10
Payer: COMMERCIAL

## 2023-10-10 ENCOUNTER — HOSPITAL ENCOUNTER (OUTPATIENT)
Facility: HOSPITAL | Age: 63
Setting detail: HOSPITAL OUTPATIENT SURGERY
Discharge: HOME | End: 2023-10-10
Attending: INTERNAL MEDICINE | Admitting: INTERNAL MEDICINE
Payer: COMMERCIAL

## 2023-10-10 ENCOUNTER — ANESTHESIA EVENT (OUTPATIENT)
Dept: ENDOSCOPY | Facility: HOSPITAL | Age: 63
End: 2023-10-10
Payer: COMMERCIAL

## 2023-10-10 VITALS
BODY MASS INDEX: 32.78 KG/M2 | TEMPERATURE: 97 F | SYSTOLIC BLOOD PRESSURE: 119 MMHG | HEIGHT: 63 IN | WEIGHT: 185 LBS | RESPIRATION RATE: 16 BRPM | HEART RATE: 59 BPM | DIASTOLIC BLOOD PRESSURE: 58 MMHG | OXYGEN SATURATION: 99 %

## 2023-10-10 PROCEDURE — 0DJ08ZZ INSPECTION OF UPPER INTESTINAL TRACT, VIA NATURAL OR ARTIFICIAL OPENING ENDOSCOPIC: ICD-10-PCS | Performed by: INTERNAL MEDICINE

## 2023-10-10 PROCEDURE — 0DJD8ZZ INSPECTION OF LOWER INTESTINAL TRACT, VIA NATURAL OR ARTIFICIAL OPENING ENDOSCOPIC: ICD-10-PCS | Performed by: INTERNAL MEDICINE

## 2023-10-10 RX ORDER — NALOXONE HYDROCHLORIDE 0.4 MG/ML
80 INJECTION, SOLUTION INTRAMUSCULAR; INTRAVENOUS; SUBCUTANEOUS AS NEEDED
Status: DISCONTINUED | OUTPATIENT
Start: 2023-10-10 | End: 2023-10-10

## 2023-10-10 RX ORDER — SODIUM CHLORIDE, SODIUM LACTATE, POTASSIUM CHLORIDE, CALCIUM CHLORIDE 600; 310; 30; 20 MG/100ML; MG/100ML; MG/100ML; MG/100ML
INJECTION, SOLUTION INTRAVENOUS CONTINUOUS
Status: DISCONTINUED | OUTPATIENT
Start: 2023-10-10 | End: 2023-10-10

## 2023-10-10 RX ORDER — RAMIPRIL 5 MG/1
5 CAPSULE ORAL DAILY
COMMUNITY

## 2023-10-10 RX ORDER — LIDOCAINE HYDROCHLORIDE 10 MG/ML
INJECTION, SOLUTION EPIDURAL; INFILTRATION; INTRACAUDAL; PERINEURAL AS NEEDED
Status: DISCONTINUED | OUTPATIENT
Start: 2023-10-10 | End: 2023-10-10 | Stop reason: SURG

## 2023-10-10 RX ADMIN — LIDOCAINE HYDROCHLORIDE 100 MG: 10 INJECTION, SOLUTION EPIDURAL; INFILTRATION; INTRACAUDAL; PERINEURAL at 07:57:00

## 2023-10-10 NOTE — DISCHARGE INSTRUCTIONS
[facility]    Discharge Instructions:      Procedure: Upper Endoscopy Findings:    1. Normal endoscopy    Procedure : Colonoscopy  Findings:    1. Diverticulosis    Disposition: home      Patient Instructions:     1. Repeat colonoscopy in 10 years. Johnson Henderson MD      Home Care Instructions for Colonoscopy and/or Gastroscopy with Sedation    Diet:  - Resume your regular diet as tolerated unless otherwise instructed. - Start with light meals to minimize bloating.  - Do not drink alcohol today. Medication:  - If you have questions about resuming your normal medications, please contact your Primary Care Physician. Activities:  - Take it easy today. Do not return to work today. - Do not drive today. - Do not operate any machinery today (including kitchen equipment). Colonoscopy:  - You may notice some rectal \"spotting\" (a little blood on the toilet tissue) for a day or two after the exam. This is normal.  - If you experience any rectal bleeding (not spotting), persistent tenderness or sharp severe abdominal pains, oral temperature over 100 degrees Fahrenheit, light-headedness or dizziness, or any other problems, contact your doctor. Gastroscopy:  - You may have a sore throat for 2-3 days following the exam. This is normal. Gargling with warm salt water (1/2 tsp salt to 1 glass warm water) or using throat lozenges will help. - If you experience any sharp pain in your neck, abdomen or chest, vomiting of blood, oral temperature over 100 degrees Fahrenheit, light-headedness or dizziness, or any other problems, contact your doctor. **If unable to reach your doctor, please go to the [facility] Emergency Room**    - Your referring physician will receive a full report of your examination.  - If you do not hear from your doctor's office within two weeks of your biopsy, please call them for your results.     You may be able to see your laboratory results in [facility] between 4 and 7 business days.  In some cases, your physician may not have viewed the results before they are released to [address] If you have questions regarding your results contact the physician who ordered the test/exam by phone or via [address] by choosing \"Ask a Medical Question. \"

## 2023-10-10 NOTE — ANESTHESIA POSTPROCEDURE EVALUATION
[address] Patient Status:  Hospital Outpatient Surgery   Age/Gender 63year old female MRN [id number]   Location [address] [address] Attending Alan Ba MD   Hosp Day # 0 PCP Jahaira Fisher MD       Anesthesia Post-op Note    ESOPHAGOGASTRODUODENOSCOPY, COLONOSCOPY    Procedure Summary       Date: 10/10/23 Room / Location: [facility] END[facility]COPY 03 / [facility]    Anesthesia Start: 9901 Anesthesia Stop: 9277    Procedures:       ESOPHAGOGASTRODUODENOSCOPY, COLONOSCOPY      COLONOSCOPY Diagnosis: (EGD: normal COLON: diverticulosis)    Surgeons: Alan Ba MD Anesthesiologist: Bridgette Urbina MD    Anesthesia Type: MAC, general ASA Status: 3            Anesthesia Type: MAC, general    Vitals Value Taken Time   /56 10/10/23 0820   Temp  10/10/23 0825   Pulse 60 10/10/23 0824   Resp 16 10/10/23 0820   SpO2 98 % 10/10/23 0824   Vitals shown include unfiled device data.     Patient Location: Endoscopy    Anesthesia Type: MAC    Airway Patency: patent    Postop Pain Control: adequate    Mental Status: mildly sedated but able to meaningfully participate in the post-anesthesia evaluation    Nausea/Vomiting: none    Cardiopulmonary/Hydration status: stable euvolemic    Complications: no apparent anesthesia related complications    Postop vital signs: stable    Dental Exam: Unchanged from Preop

## 2023-10-10 NOTE — OPERATIVE REPORT
[address] Patient Status:  Hospital Outpatient Surgery    11/3/1960 MRN [id number]   Location [address] [address] Attending Kevin Willett MD   Hosp Day # 0 PCP Gerald Hoff MD         PATIENT NAME: Christal Gonzalez  DATE OF OPERATION: 10/10/2023    PREOPERATIVE DIAGNOSIS:  NV  Abd pain  Rectal bleeding  Colitis on CT   POSTOPERATIVE DIAGNOSIS:  Normal EGD  Diverticulosis    PROCEDURE PERFORMED: Upper endoscopy, colonoscopy with MAC sedation  SURGEON: Azalia Almanza MD   MEDICATIONS: MAC IV in divided doses under the supervision of Anesthesia. PROCEDURE AND FINDINGS: The patient was placed into the left lateral decubitus position after informed consent was obtained. All questions were answered. MAC IV sedation was administered. The Olympus video gastroscope was introduced into the mouth and passed to the third portion of the duodenum. The entire examined esophagus was normal.  The entire examined stomach,  including retroflexion view, was normal.  The entire examined duodenum was normal.   The gastroscope was removed from the patient. The procedure was completed. The patient tolerated the procedure well. The patient was then placed into position for the colonoscopy. Further IV sedation was administered. A digital rectal exam was performed which was normal.  The Olympus video colonoscope was then introduced through the rectum and advanced through the colon to the cecum. The quality of prep was excellent, adequate, Aronchick 1. The cecum was identified by the ileocecal valve and appendiceal orifice. The colonoscope was then withdrawn and the mucosa was further carefully inspected. There were diverticula noted in the sigmoid colon. The remainder of the entire examined colon was otherwise normal.  After retroflexion in the rectum, the colonoscope was straightened and removed and the procedure was completed. The patient tolerated the procedure well.  There were no implants placed nor significant blood loss. There were no immediate apparent complications. Moderate sedation time:  None. Deep sedation provided by anesthesia    RECOMMENDATIONS   Consume a high fiber diet. Repeat colonoscopy in 10 years    Perlita Elise MD

## 2024-01-24 DIAGNOSIS — N32.81 OAB (OVERACTIVE BLADDER): ICD-10-CM

## 2024-01-26 RX ORDER — MIRABEGRON 50 MG/1
50 TABLET, FILM COATED, EXTENDED RELEASE ORAL DAILY
Qty: 90 TABLET | Refills: 3 | OUTPATIENT
Start: 2024-01-26

## 2024-02-09 DIAGNOSIS — N32.81 OAB (OVERACTIVE BLADDER): ICD-10-CM

## 2024-02-12 DIAGNOSIS — N32.81 OAB (OVERACTIVE BLADDER): ICD-10-CM

## 2024-02-12 RX ORDER — MIRABEGRON 50 MG/1
50 TABLET, FILM COATED, EXTENDED RELEASE ORAL DAILY
Qty: 90 TABLET | Refills: 3 | OUTPATIENT
Start: 2024-02-12

## 2024-02-12 NOTE — TELEPHONE ENCOUNTER
LM for pt regarding refill request for myrbetriq. Pt hasn't been seen for 1 1/2 yrs. Needs appt for further refills. Can ask PCP if he would prescribe, but otherwise needs appt.

## 2024-02-13 ENCOUNTER — TELEPHONE (OUTPATIENT)
Dept: UROLOGY | Facility: CLINIC | Age: 64
End: 2024-02-13

## 2024-02-13 RX ORDER — MIRABEGRON 50 MG/1
50 TABLET, FILM COATED, EXTENDED RELEASE ORAL DAILY
Qty: 90 TABLET | Refills: 0 | Status: SHIPPED | OUTPATIENT
Start: 2024-02-13

## 2024-02-13 NOTE — TELEPHONE ENCOUNTER
Contacted patient to remind of Yearly appointment on 2/23/24.  LVM with appointment date, time, clinic address, and call back number for any questions.  Also LVM with reminder to bring new insurance card to appointment.

## 2024-02-23 ENCOUNTER — TELEPHONE (OUTPATIENT)
Dept: UROLOGY | Facility: CLINIC | Age: 64
End: 2024-02-23

## 2024-02-23 NOTE — TELEPHONE ENCOUNTER
Patient arrived to Yearly appointment on time and waited for 35min.  Patient came to  and stated \"I have to go, I've been sitting here waiting for too long\".  Patient was assured that she would be seen shortly as her chart was taken by RN, to which patient stated \"No thanks\".  Patient was then asked if she would like to wait just another minute to be called back, to which patient shook her head and said \"no\" and walked away before being asked if she would like to reschedule her Yearly.  Clinical Coordinator made aware.

## 2024-04-20 DIAGNOSIS — N32.81 OAB (OVERACTIVE BLADDER): ICD-10-CM

## 2024-04-23 RX ORDER — MIRABEGRON 50 MG/1
50 TABLET, FILM COATED, EXTENDED RELEASE ORAL DAILY
Qty: 90 TABLET | Refills: 3 | OUTPATIENT
Start: 2024-04-23

## 2024-07-28 ENCOUNTER — HOSPITAL ENCOUNTER (OUTPATIENT)
Age: 64
Discharge: HOME | End: 2024-07-28
Payer: COMMERCIAL

## 2024-07-28 VITALS
BODY MASS INDEX: 33.66 KG/M2 | WEIGHT: 190 LBS | HEART RATE: 65 BPM | TEMPERATURE: 97 F | HEIGHT: 63 IN | OXYGEN SATURATION: 96 % | RESPIRATION RATE: 20 BRPM | SYSTOLIC BLOOD PRESSURE: 134 MMHG | DIASTOLIC BLOOD PRESSURE: 63 MMHG

## 2024-07-28 DIAGNOSIS — U07.1 COVID-19: Primary | ICD-10-CM

## 2024-07-28 LAB — SARS-COV-2 RNA RESP QL NAA+PROBE: DETECTED

## 2024-07-28 PROCEDURE — 99212 OFFICE O/P EST SF 10 MIN: CPT

## 2024-07-28 PROCEDURE — 99213 OFFICE O/P EST LOW 20 MIN: CPT

## 2024-07-28 NOTE — ED PROVIDER NOTES
Patient Seen in: [facility]      History     Chief Complaint   Patient presents with    Cough/URI     Stated Complaint: cold like symptoms    Subjective:   HPI    63-year-old female with known history of asthma, alcohol abuse, COPD, cardiomyopathy, lipidemia and a defibrillator who comes in today complaining of upper respiratory complaints sinus pressure and cough.  Patient states that she had it approximately a week ago was feeling better on Wednesday and then 2 days later symptoms restarted.  Patient's now had 3 days of generalized fatigue, sinus pressure nasal congestion sore throat and cough.  Denies shortness of breath or chest pain.    Objective:   Past Medical History:    Alcohol abuse    history    Alcoholic cardiomyopathy (HCC)    Anxiety state, unspecified    Asthma ([facility])    Back problem    spinal fusion in     Cardiomyopathy    had defibrillator in  for hx of ARVD, no further episodes since then per pt                   Cervical dysplasia    Chronic rhinitis    COPD (chronic obstructive pulmonary disease) ([facility])    No O2    Depression    Esophageal reflux    Extrinsic asthma, unspecified    moderate persistent    Fibrocystic breast disease    Fibroids    Ganglion, unspecified    H/O mammogram    benign    H/O pregnancy        High blood pressure    High cholesterol    History of suburethral sling procedure    Major depressive disorder    Osteoarthritis    right hip bone spurs    Osteopenia    Other and unspecified hyperlipidemia    Pap smear for cervical cancer screening    wnl    PONV (postoperative nausea and vomiting)    Seizure disorder ([facility])    one seizure that was heart related - defibritaltor put in     Sleep apnea    CPAP    Visual impairment    contacts/glasses              Past Surgical History:   Procedure Laterality Date    Cardiac defibrillator placement      Medtronic single chamber ICD    Colonoscopy  10/18/17= Diverticulosis    Repeat      Colonoscopy N/A 10/18/2017    Procedure: COLONOSCOPY;  Surgeon: Tan Irizarry MD;  Location:  ENDOSCOPY    Colonoscopy N/A 10/10/2023    Procedure: COLONOSCOPY;  Surgeon: Tan Irizarry MD;  Location:  ENDOSCOPY    Colposcopy, cervix w/upper adjacent vagina; w/biopsy(s), cervix  1985    Cryocautery of cervix  1985    CECE    Dexa bone density axial (cpt=77080)  06/25/2008    osteopenia, hip    Fluor gid & loclzj ndl/cath spi dx/ther njx N/A 07/03/2014    Procedure: LUMBAR EPIDURAL;  Surgeon: Evan Johnson DO;  Location: [facility]    Hc implant vaginal sling      Hip replacement surgery      Hysterectomy  06/26/2012    erin S & O    Injection proc for sacroiliac joint arthrography &/or anesthetic/steroid Bilateral 06/05/2014    Procedure: SI JOINT INJECTION;  Surgeon: Evan Johnson DO;  Location: [facility]    Injection, w/wo contrast, dx/therapeutic substance, epidural/subarachnoid; lumbar/sacral N/A 07/03/2014    Procedure: LUMBAR EPIDURAL;  Surgeon: Evan Johnson DO;  Location: [facility]    M-sedaj by  phys perfrmg svc 5+ yr N/A 07/03/2014    Procedure: LUMBAR EPIDURAL;  Surgeon: Evan Johnson DO;  Location: [facility]    Li biopsy stereo nodule 2 site bilat (cpt=19081/75558)  2009    gera    Li localization wire 1 site left (cpt=19281)  2011    benign    Needle biopsy left  2012?    benign per pt    Removal of lung,lobectomy  01/2019     VATS, right upper lobe wedge resection, mediastinal lymph node dissection, benign nodule    Spine surgery procedure unlisted      Lumbar Spinal Fusion    Tonsillectomy      child    Total hip replacement Right                 Social History     Socioeconomic History    Marital status:    Tobacco Use    Smoking status: Former     Current packs/day: 0.00     Average packs/day: 1 pack/day for 35.7 years (35.7 ttl pk-yrs)     Types: Cigarettes     Start date: 1/13/1979      Quit date: 2014     Years since quittin.8    Smokeless tobacco: Never    Tobacco comments:     smokes e cigarette now, quiting/at least 30 pack hx per md note   Vaping Use    Vaping status: Every Day    Substances: Nicotine    Devices: Disposable   Substance and Sexual Activity    Alcohol use: Not Currently     Alcohol/week: 0.0 standard drinks of alcohol     Comment: no alcohol for 14 years    Drug use: No    Sexual activity: Yes     Partners: Male   Other Topics Concern    Caffeine Concern Yes     Comment: 4 daily    Exercise Yes     Comment: core exercises              Review of Systems    Positive for stated Chief Complaint: Cough/URI    Other systems are as noted in HPI.  Constitutional and vital signs reviewed.      All other systems reviewed and negative except as noted above.    Physical Exam     ED Triage Vitals [24 0829]   /63   Pulse 65   Resp 20   Temp 97.2 °F (36.2 °C)   Temp src Temporal   SpO2 96 %   O2 Device None (Room air)       Current Vitals:   Vital Signs  BP: 134/63  Pulse: 65  Resp: 20  Temp: 97.2 °F (36.2 °C)  Temp src: Temporal    Oxygen Therapy  SpO2: 96 %  O2 Device: None (Room air)            Physical Exam    General Appearance: Alert, cooperative, no distress, appropriate for age   Head: Normocephalic, without obvious abnormality   Eyes:  conjunctiva and cornea clear, both eyes   Ears: TMs bilaterally pearly gray with good light reflex. ear canals clear bilaterally no mastoid tenderness  Throat: no trismus or drooling no phonation changes, patient handling secretions well   Lungs: Clear to auscultation bilaterally, respirations unlabored. No audible wheezing, rales or rhonchi.  Heart: Regular rate and rhythm, no murmurs    ED Course     Labs Reviewed   RAPID SARS-COV-2 BY PCR - Abnormal; Notable for the following components:       Result Value    Rapid SARS-CoV-2 by PCR Detected (*)     All other components within normal limits              [facility]          Medical  Decision Making  62yo F who comes in with complaints of congested cough and sinus pressure    Problems Addressed:  COVID-19: acute illness or injury    Amount and/or Complexity of Data Reviewed  Labs: ordered. Decision-making details documented in ED Course.     Details: Covid 19 +     Risk  OTC drugs.  Prescription drug management.  Risk Details: Differential diagnosis includes COVID versus other viral illness.    Clinical Impression: COVID 19    Nasal swab for COVID is positive.  Home with supportive care for COVID, reviewed instructiosn on when to return to work. Tylenol and Motrin for body aches and family doctor follow-up.  Patient agrees to plan. Understands signs and symptoms to look out for and return if worsening    I discussed with the patient positive COVID-19 test result.  Patient was advised of current CDC guidlines on quarantine.  I reviewed when to return to the emergency room if any worsening symptoms specially shortness of breath.  close follow up with PCP.  Patient verbalizes understanding of above and was discharges with stable vital signs.            Disposition and Plan     Clinical Impression:  1. COVID-19         Disposition:  Discharge  7/28/2024  9:06 am    Follow-up:  Yossi Krueger MD  [address] [place] [address]  [phone]    Schedule an appointment as soon as possible for a visit in [address] [address] [address] [facility] [address] [phone]    If symptoms worsen          Medications Prescribed:  Discharge Medication List as of 7/28/2024  9:07 AM

## 2024-07-28 NOTE — DISCHARGE INSTRUCTIONS
The new CDC guidance no longer requires that those who test positive for COVID-19 should isolate for a minimum of five days before returning to work or other activities and instead focuses on isolation until symptoms improve - which could be shorter or longer than five days. The key recommendation for those with any seasonal respiratory virus is to stay home and away from others until their symptoms improve.    You can return to normal activities when symptoms have been improving for 24 hours - and if a fever was present, it has been gone for 24 hours without use of a fever-reducing medication.    In addition, once people resume normal activities, they are encouraged to take precautions for the next five days to curb disease spread, including:    ·         Taking more steps for  air    ·         Enhancing hygiene practices    ·         Wearing a well-fitting mask    ·         Keeping a distance from others and/or    ·         Getting tested to inform your actions to prevent spread to others.    These precautions also apply to those who never had symptoms but tested positive for a common seasonal respiratory virus and are especially important when it comes to protecting those most at risk for severe illness, including those over 65 and people with weakened immune systems.      People with COVID-19 should receive supportive care to help relieve symptoms.    Tylenol alternating with ibuprofen for fever/chills/body ache  Drink plenty of fluids and rest

## 2025-06-12 ENCOUNTER — OFFICE VISIT (OUTPATIENT)
Dept: PODIATRY CLINIC | Facility: CLINIC | Age: 65
End: 2025-06-12

## 2025-06-12 DIAGNOSIS — M79.675 TOE PAIN, LEFT: ICD-10-CM

## 2025-06-12 DIAGNOSIS — M79.674 TOE PAIN, RIGHT: ICD-10-CM

## 2025-06-12 DIAGNOSIS — B35.1 ONYCHOMYCOSIS: Primary | ICD-10-CM

## 2025-06-12 PROCEDURE — 99213 OFFICE O/P EST LOW 20 MIN: CPT | Performed by: STUDENT IN AN ORGANIZED HEALTH CARE EDUCATION/TRAINING PROGRAM

## 2025-06-12 RX ORDER — HYDROCODONE BITARTRATE AND ACETAMINOPHEN 5; 325 MG/1; MG/1
1 TABLET ORAL EVERY 4 HOURS PRN
COMMUNITY
Start: 2023-12-23

## 2025-06-15 NOTE — PROGRESS NOTES
[facility] Podiatry  Progress Note    Lianne Sierra is a 64 year old female.   Chief Complaint   Patient presents with    Foot Pain     New pt- onset 2 years ago toes were hitting the top of the shoes from walking Right hallux discolored- Left foot dropped garden tool on foot- declines pain         HPI:     Patient is a pleasant 64 year old female who PTC for evaluation of possible nail fungus to her right hallux. She does have history of nail having bruise/partially falling off, and has continue dystrophic changes to site. She also dropped a garden tool on her left foot and wants to make sure it is OK. No other complaints are mentioned. PMHx, medications, and allergies were reviewed.       Allergies: Aminoglycosides, Levaquin, Oxybutynin, Ciprofloxacin, and Other   Current Medications[1]   Past Medical History[2]   Past Surgical History[3]   Family History[4]   Social Hx on file[5]        REVIEW OF SYSTEMS:     No n/v/f/c.      EXAM:   LMP 06/28/2012   GENERAL: well developed, well nourished, in no apparent distress  EXTREMITIES:       1. Integument: Normal skin temperature and turgor. Right hallux nail is thickened, dystrophic, and with subungual debris. Small scab to left foot where garden tool fell on site.  2. Vascular: Dorsalis pedis two out of four bilateral and posterior tibial pulses two out of   four bilateral, capillary refill normal.   3. Musculoskeletal: All muscle groups are graded 5 out of 5 in the foot and ankle. EHL tendon intact to left foot. Compartments are soft and compressible. No strength deficits.   4. Neurological: Normal sharp dull sensation; reflexes normal.        ASSESSMENT AND PLAN:   Diagnoses and all orders for this visit:    Onychomycosis  -     Dermatophyte Only, Culture [E]; Future    Toe pain, right  -     EEH AMB POD XR - LT FOOT 2 VIEWS(AP, LATERAL)WT BEARING        Plan:    -Patient examined, chart history reviewed.  -Discussed etiology of condition and various  treatment options.  -X-rays obtained and reviewed--no acute fx or osseous abnormalities.  -Left foot foot appears to be healing well and EHL tendon is intact--can consider MRI if symptoms worsen or fail to improve.  -Right hallux nail biopsy obtained. Pending results may consider oral vs topical options.  -Nail as also trimmed and smoothed with dremel.  -Will reach out with nail biopsy results and discuss next steps.    The patient indicates understanding of these issues and agrees to the plan.        Luis Carlos Cantrell DPM    Dragon speech recognition software was used to prepare this note.  Errors in word recognition may occur.  Please contact me with any questions/concerns with this note.         [1]   Current Outpatient Medications   Medication Sig Dispense Refill    HYDROcodone-acetaminophen 5-325 MG Oral Tab Take 1 tablet by mouth every 4 (four) hours as needed.      acetaminophen 160 MG/5ML Oral Elixir       Mirabegron ER (MYRBETRIQ) 50 MG Oral Tablet 24 Hr Take 1 tablet (50 mg total) by mouth daily. 90 tablet 0    ramipril 5 MG Oral Cap Take 1 capsule (5 mg total) by mouth daily.      omeprazole 20 MG Oral Capsule Delayed Release Take 1 capsule (20 mg total) by mouth every morning before breakfast.      estradiol (ESTRACE) 0.1 MG/GM Vaginal Cream Apply 1 gram vaginally 3 times per week. 42.5 g 3    albuterol 108 (90 Base) MCG/ACT Inhalation Aero Soln Inhale 2 puffs into the lungs every 6 (six) hours as needed for Wheezing. 3 each 2    fluticasone-umeclidin-vilant (TRELEGY ELLIPTA) 100-62.5-25 MCG/INH Inhalation Aerosol Powder, Breath Activated Inhale 1 puff into the lungs daily. 3 each 2    escitalopram 20 MG Oral Tab Take 1 tablet (20 mg total) by mouth daily. 90 tablet 1    buPROPion 300 MG Oral Tablet 24 Hr Take 1 tablet (300 mg total) by mouth daily. 90 tablet 1    montelukast 10 MG Oral Tab Take 1 tablet (10 mg total) by mouth every evening. 90 tablet 1    METOPROLOL SUCCINATE  MG Oral Tablet 24 Hr TAKE  1 TABLET BY MOUTH ONE TIME A DAY 90 tablet 3    ATORVASTATIN 10 MG Oral Tab TAKE 1 TABLET BY MOUTH IN THE EVENING  90 tablet 3    Fluticasone Propionate 50 MCG/ACT Nasal Suspension 2 sprays by Nasal route daily.      ALBUTEROL SULFATE (2.5 MG/3ML) 0.083% Inhalation Nebu Soln USE 1 AMPULE IN NEBULIZER EVERY SIX HOURS  75 mL 1    Spacer/Aero-Holding Chambers (AEROCHAMBER MV) Does not apply Misc use with inhaler 1 Each 0   [2]   Past Medical History:   Alcohol abuse    history    Alcoholic cardiomyopathy ([facility])    Anxiety state, unspecified    Asthma ([facility])    Back problem    spinal fusion in     Cardiomyopathy    had defibrillator in  for hx of ARVD, no further episodes since then per pt                   Cervical dysplasia    Chronic rhinitis    COPD (chronic obstructive pulmonary disease) ([facility])    No O2    Depression    Esophageal reflux    Extrinsic asthma, unspecified    moderate persistent    Fibrocystic breast disease    Fibroids    Ganglion, unspecified    H/O mammogram    benign    H/O pregnancy        High blood pressure    High cholesterol    History of suburethral sling procedure    Major depressive disorder    Osteoarthritis    right hip bone spurs    Osteopenia    Other and unspecified hyperlipidemia    Pap smear for cervical cancer screening    wnl    PONV (postoperative nausea and vomiting)    Seizure disorder ([facility])    one seizure that was heart related - defibritaltor put in     Sleep apnea    CPAP    Visual impairment    contacts/glasses   [3]   Past Surgical History:  Procedure Laterality Date    Cardiac defibrillator placement      Medtronic single chamber ICD    Colonoscopy  10/18/17= Diverticulosis    Repeat     Colonoscopy N/A 10/18/2017    Procedure: COLONOSCOPY;  Surgeon: Tan Irizarry MD;  Location:  ENDOSCOPY    Colonoscopy N/A 10/10/2023    Procedure: COLONOSCOPY;  Surgeon: Tan Irizarry MD;  Location:  ENDOSCOPY    Colposcopy, cervix w/upper adjacent vagina;  w/biopsy(s), cervix  1985    Cryocautery of cervix  1985    CECE    Dexa bone density axial (cpt=77080)  06/25/2008    osteopenia, hip    Fluor gid & loclzj ndl/cath spi dx/ther njx N/A 07/03/2014    Procedure: LUMBAR EPIDURAL;  Surgeon: Evan Johnson DO;  Location: [facility]    Hc implant vaginal sling      Hip replacement surgery      Hysterectomy  06/26/2012    erin S & O    Injection proc for sacroiliac joint arthrography &/or anesthetic/steroid Bilateral 06/05/2014    Procedure: SI JOINT INJECTION;  Surgeon: Evan Johnson DO;  Location: [facility]    Injection, w/wo contrast, dx/therapeutic substance, epidural/subarachnoid; lumbar/sacral N/A 07/03/2014    Procedure: LUMBAR EPIDURAL;  Surgeon: Evan Johnson DO;  Location: [facility]    M-sedaj by  phys perfrmg svc 5+ yr N/A 07/03/2014    Procedure: LUMBAR EPIDURAL;  Surgeon: Evan Johnson DO;  Location: [facility]    Li biopsy stereo nodule 2 site bilat (cpt=19081/71678)  2009    gera    Li localization wire 1 site left (cpt=19281)  2011    benign    Needle biopsy left  2012?    benign per pt    Removal of lung,lobectomy  01/2019     VATS, right upper lobe wedge resection, mediastinal lymph node dissection, benign nodule    Spine surgery procedure unlisted      Lumbar Spinal Fusion    Tonsillectomy      child    Total hip replacement Right    [4]   Family History  Adopted: Yes   Problem Relation Age of Onset    Allergies Daughter     Asthma Daughter     Asthma Son    [5]   Social History  Socioeconomic History    Marital status:    Tobacco Use    Smoking status: Former     Current packs/day: 0.00     Average packs/day: 1 pack/day for 35.7 years (35.7 ttl pk-yrs)     Types: Cigarettes     Start date: 1/13/1979     Quit date: 9/21/2014     Years since quitting: 10.7    Smokeless tobacco: Never    Tobacco comments:     smokes e cigarette now, quiting/at least 30  pack hx per md note   Vaping Use    Vaping status: Every Day    Substances: Nicotine    Devices: Disposable   Substance and Sexual Activity    Alcohol use: Not Currently     Alcohol/week: 0.0 standard drinks of alcohol     Comment: no alcohol for 14 years    Drug use: No    Sexual activity: Yes     Partners: Male   Other Topics Concern    Caffeine Concern Yes     Comment: 4 daily    Exercise Yes     Comment: core exercises

## (undated) DEVICE — HEMOCLIP MED 24 CLIP/CARTRIDGE

## (undated) DEVICE — STERILE POLYISOPRENE POWDER-FREE SURGICAL GLOVES: Brand: PROTEXIS

## (undated) DEVICE — ARYGLE SUCTION CATHETER WITH CHIMNEY VALVE STRIAGHT PACKED 14 FR/ CH: Brand: ARGYLE

## (undated) DEVICE — KIT VLV 5 PC AIR H2O SUCT BX ENDOGATOR CONN

## (undated) DEVICE — DRAIN CHEST DRY ADULT/PED

## (undated) DEVICE — #15 STERILE STAINLESS BLADE: Brand: STERILE STAINLESS BLADES

## (undated) DEVICE — 3M™ TEGADERM™ TRANSPARENT FILM DRESSING, 1626W, 4 IN X 4-3/4 IN (10 CM X 12 CM), 50 EACH/CARTON, 4 CARTON/CASE: Brand: 3M™ TEGADERM™

## (undated) DEVICE — SYRINGE 10ML LL TIP

## (undated) DEVICE — GLOVE SURG TRIUMPH SZ 8

## (undated) DEVICE — 1010 S-DRAPE TOWEL DRAPE 10/BX: Brand: STERI-DRAPE™

## (undated) DEVICE — 3M™ STERI-DRAPE™ INSTRUMENT POUCH 1018: Brand: STERI-DRAPE™

## (undated) DEVICE — 1200CC GUARDIAN II: Brand: GUARDIAN

## (undated) DEVICE — SUTURE SILK 0 FSL

## (undated) DEVICE — ANTERIOR HIP: Brand: MEDLINE INDUSTRIES, INC.

## (undated) DEVICE — SUTURE VICRYL 1 CPX

## (undated) DEVICE — DECANTER BAG 9": Brand: MEDLINE INDUSTRIES, INC.

## (undated) DEVICE — THE ECHELON, ECHELON ENDOPATH™ AND ECHELON FLEX™ FAMILIES OF ENDOSCOPIC LINEAR CUTTERS AND RELOADS ARE STERILE, SINGLE PATIENT USE INSTRUMENTS THAT SIMULTANEOUSLY CUT AND STAPLE TISSUE. THERE ARE SIX STAGGERED ROWS OF STAPLES, THREE ON EITHER SIDE OF THE CUT LINE. THE 45 MM INSTRUMENTS HAVE A STAPLE LINE THATIS APPROXIMATELY 45 MM LONG AND A CUT LINE THAT IS APPROXIMATELY 42 MM LONG. THE SHAFT CAN ROTATE FREELY IN BOTH DIRECTIONS AND AN ARTICULATION MECHANISM ON ARTICULATING INSTRUMENTS ENABLES BENDING THE DISTAL PORTIONOF THE SHAFT TO FACILITATE LATERAL ACCESS OF THE OPERATIVE SITE.THE INSTRUMENTS ARE SHIPPED WITHOUT A RELOAD AND MUST BE LOADED PRIOR TO USE. A STAPLE RETAINING CAP ON THE RELOAD PROTECTS THE STAPLE LEG POINTS DURING SHIPPING AND TRANSPORTATION. THE INSTRUMENTS’ LOCK-OUT FEATURE IS DESIGNED TO PREVENT A USED RELOAD FROM BEING REFIRED.: Brand: ECHELON ENDOPATH

## (undated) DEVICE — Device: Brand: PLUMEPEN

## (undated) DEVICE — 3M™ IOBAN™ 2 ANTIMICROBIAL INCISE DRAPE 6650EZ: Brand: IOBAN™ 2

## (undated) DEVICE — NON-ADHERENT PAD PREPACK: Brand: TELFA

## (undated) DEVICE — PILLOW ABDUCTION HIP SM

## (undated) DEVICE — SUTURE MONOCRYL 4-0 PS-2

## (undated) DEVICE — CODMAN® INTEGRATED BIPOLAR CORD AND TUBING SET FLYING LEADS, ROTARY PUMP: Brand: CODMAN®

## (undated) DEVICE — SYRINGE 30ML LL TIP

## (undated) DEVICE — CHLORAPREP 26ML APPLICATOR

## (undated) DEVICE — STANDARD HYPODERMIC NEEDLE,POLYPROPYLENE HUB: Brand: MONOJECT

## (undated) DEVICE — 3M™ RED DOT™ MONITORING ELECTRODE WITH FOAM TAPE AND STICKY GEL, 50/BAG, 20/CASE, 72/PLT 2570: Brand: RED DOT™

## (undated) DEVICE — BITEBLOCK ENDOSCP 60FR MAXI STRP

## (undated) DEVICE — ENDOSCOPY PACK - LOWER: Brand: MEDLINE INDUSTRIES, INC.

## (undated) DEVICE — PADDING CAST COTTON  4

## (undated) DEVICE — KENDALL SCD EXPRESS SLEEVES, KNEE LENGTH, MEDIUM: Brand: KENDALL SCD

## (undated) DEVICE — CV PACK-LF: Brand: MEDLINE INDUSTRIES, INC.

## (undated) DEVICE — Device: Brand: DEFENDO AIR/WATER/SUCTION AND BIOPSY VALVE

## (undated) DEVICE — SOL  .9 1000ML BTL

## (undated) DEVICE — MEDI-VAC NON-CONDUCTIVE SUCTION TUBING: Brand: CARDINAL HEALTH

## (undated) DEVICE — SUTURE VICRYL 2-0 CP-1

## (undated) DEVICE — SUTURE ETHIBOND 5 CCS

## (undated) DEVICE — DRESSING AQUACEL AG 3.5 X 10

## (undated) DEVICE — DRAPE C-ARM UNIVERSAL

## (undated) DEVICE — 3M™ STERI-STRIP™ REINFORCED ADHESIVE SKIN CLOSURES, R1547, 1/2 IN X 4 IN (12 MM X 100 MM), 6 STRIPS/ENVELOPE: Brand: 3M™ STERI-STRIP™

## (undated) DEVICE — THE ECHELON FLEX POWERED PLUS ARTICULATING ENDOSCOPIC LINEAR CUTTERS ARE STERILE, SINGLE PATIENT USE INSTRUMENTS THAT SIMULTANEOUSLYCUT AND STAPLE TISSUE. THERE ARE SIX STAGGERED ROWS OF STAPLES, THREE ON EITHER SIDE OF THE CUT LINE. THE ECHELON FLEX 45 POWERED PLUSINSTRUMENTS HAVE A STAPLE LINE THAT IS APPROXIMATELY 45 MM LONG AND A CUT LINE THAT IS APPROXIMATELY 42 MM LONG. THE SHAFT CAN ROTATE FREELYIN BOTH DIRECTIONS AND AN ARTICULATION MECHANISM ENABLES THE DISTAL PORTION OF THE SHAFT TO PIVOT TO FACILITATE LATERAL ACCESS TO THE OPERATIVESITE.THE INSTRUMENTS ARE PACKAGED WITH A PRIMARY LITHIUM BATTERY PACK THAT MUST BE INSTALLED PRIOR TO USE. THERE ARE SPECIFIC REQUIREMENTS FORDISPOSING OF THE BATTERY PACK. REFER TO THE BATTERY PACK DISPOSAL SECTION.THE INSTRUMENTS ARE PACKAGED WITHOUT A RELOAD AND MUST BE LOADED PRIOR TO USE. A STAPLE RETAINING CAP ON THE RELOAD PROTECTS THE STAPLE LEGPOINTS DURING SHIPPING AND TRANSPORTATION. THE INSTRUMENTS’ LOCK-OUT FEATURE IS DESIGNED TO PREVENT A USED OR IMPROPERLY INSTALLED RELOADFROM BEING REFIRED OR AN INSTRUMENT FROM BEING FIRED WITHOUT A RELOAD.: Brand: ECHELON FLEX

## (undated) DEVICE — HOOD, PEEL-AWAY: Brand: FLYTE

## (undated) DEVICE — SKIN AFFIX .4ML

## (undated) DEVICE — STERIS KITS

## (undated) DEVICE — ADAPTER AIR H2O CLN DISP FOR OLY GI E BIOGRD

## (undated) DEVICE — Device

## (undated) DEVICE — TRAY FOLEY 16F IC URINMETER

## (undated) DEVICE — Device: Brand: STABLECUT®

## (undated) DEVICE — GOWN SURG AERO CHROME XXL

## (undated) DEVICE — BLADE ELECTRODE: Brand: EDGE

## (undated) DEVICE — SPECIMEN CONTAINER,POSITIVE SEAL INDICATOR, OR PACKAGED: Brand: PRECISION

## (undated) DEVICE — SUTURE VICRYL 2-0 CT-1

## (undated) DEVICE — WRAP COOLING HIP W/ICE PILLOW

## (undated) DEVICE — SOLUTION ENDOSCOPIC ANTI-FOG NON-TOXIC NON-ABRASIVE 6 CUBIC CENTIMETER WITH RADIOPAQUE ADHESIVE-BACKED SPONGE STERILE NOT MADE WITH NATURAL RUBBER LATEX MEDICHOICE: Brand: MEDICHOICE

## (undated) DEVICE — SHEET,DRAPE,70X100,STERILE: Brand: MEDLINE

## (undated) DEVICE — 10FT COMBINED O2 DELIVERY/CO2 MONITORING. FILTER WITH MICROSTREAM TYPE LUER: Brand: DUAL ADULT NASAL CANNULA

## (undated) DEVICE — ABSORBABLE HEMOSTAT (OXIDIZED REGENERATED CELLULOSE, U.S.P.): Brand: SURGICEL

## (undated) DEVICE — GAUZE SPONGES,USP TYPE VII GAUZE, 12 PLY: Brand: CURITY

## (undated) DEVICE — FILTERLINE NASAL ADULT O2/CO2

## (undated) DEVICE — GLOVE SURG TRIUMPH SZ 6

## (undated) NOTE — LETTER
Date & Time: 7/28/2024, 9:06 AM  Patient: Lianne Sierra  Encounter Provider(s):    Rachelle Gonzalez PA-C       To Whom It May Concern:    Lianne Sierra was seen and treated in our department on 7/28/2024. She should not return to work until 8/1/24 .    If you have any questions or concerns, please do not hesitate to call.      Rachelle Gonzalez PA-C    _____________________________  Physician/APC Signature

## (undated) NOTE — LETTER
Date: 1/25/2019    Patient Name: Irving Galicia          To Whom it may concern: This letter has been written at the patient's request. The above patient was seen at the [facility] for treatment of a medical condition on 1/25/19.     The

## (undated) NOTE — LETTER
Alexandria Solorzano [address] [address]  [place], [address] [facility]    Consent for Operation  Date: __________________                                Time: _______________    1.  I authorize the performance upon Damon Mccray the following operation:    2. fl revealed by the pictures or by descriptive texts accompanying them. If the procedure has been videotaped, the surgeon will obtain the original videotape. The hospital will not be responsible for storage or maintenance of this tape.   7. For the purpose of a THAT MY DOCTOR PROVIDED ME WITH THE ABOVE EXPLANATIONS, THAT ALL BLANKS OR STATEMENTS REQUIRING INSERTION OR COMPLETION WERE FILLED IN.     Signature of Patient:   ___________________________    When the patient is a minor or mentally incompetent to give co iii. All of the medicines I take (including prescriptions, herbal supplements, and pills I can buy without a prescription (including street drugs/illegal medications).  Failure to inform my anesthesiologist about these medicines may increase my risk of anes _____________________________________________________________________________  Anesthesiologist Signature     Date   Time  I have discussed the procedure and information above with the patient (or patient’s representative) and answered their questions.  The

## (undated) NOTE — LETTER
June 30, 2023       Thomas hSaver   [address]  [place] [address]       To Whom It May Concern:    Thomas Shaver has been under our care regarding ongoing medical issues. She has been hospitalized from 6/27/2023 to 6/30/2023    Please feel free to contact us if there are any questions. Sincerely,      Randy Caballero MD  [facility]  [phone]      Document generated by:   Randy Caballero MD

## (undated) NOTE — IP AVS SNAPSHOT
Patient Demographics     Address  [address] [place] [address] Phone  [phone] [facility])  [phone] (Work)  [phone] [facility]) *Preferred* E-mail Address  [email or web address]      Emergency Contact(s)     Name Relation Home Work Kenneth Singh Wear your knee-high compressive stockings during day time until follow up appointment. Elevate and ice to control swelling. Continue to use inspiratory spirometer at home. Continue to perform active ankle pumps at home.     Eliquis is a blood thinner to ? Not allowed while taking narcotic pain medication or muscle relaxants. Sex  ? Usually allowed after four to six weeks – check with surgeon at your office visit. Return to work  ? Usually allowed after four to six weeks.  Discuss specific work ac ? Contact your physician if you have signs of bruising, nose bleeds or blood in your urine. Use electric razors and soft toothbrushes only. ? Do not take aspirin while taking blood thinners unless ordered by your physician. ?  Review anticoagulant educati laxatives such as Miralax or Milk of Magnesia if needed. ? An enema or suppository may be needed if above measures do not work. Prevention of infection and promotion of healing  ? Good hand washing is important.  Everyone should wash their hands or u ? Increased redness, swelling, or warmth of skin around incision. ? Increased or foul smelling drainage from incision  ? Red streaks on skin near incision. ? Temperature >100.4F.  ? Increased pain at incision not relieved by pain medication.         Signs you live in another township, you may check with them as well).  You need space to open car doors to position yourself properly with walker to get in and out of your car safely; some parking spaces are  practically on top of each other and do not give you e Commonly known as:  VENTOLIN      USE 1 AMPULE IN NEBULIZER EVERY SIX HOURS   Pranav Mike MD         apixaban 5 MG Tabs  Commonly known as:  ELIQUIS      Take 0.5 tablets (2.5 mg total) by mouth 2 (two) times daily.    Edman Meter, LILIA sapp or nurse    Bring a paper prescription for each of these medications  apixaban 5 MG Tabs  celecoxib 200 MG Caps  docusate sodium 100 MG Caps  HYDROcodone-acetaminophen  MG Tabs           [id number] - MAR ACTION REPORT  (last 24 hrs)    ** SITE UNKNOWN 11/04/19 1754 Given      [id number] ketorolac tromethamine (TORADOL) 30 MG/ML injection 30 mg 11/05/19 0009 Given      [id number] ketorolac tromethamine (TORADOL) 30 MG/ML injection 30 mg 11/05/19 0639 Given      [id number] ketorolac tromethamine (TORADOL) 30 Ordering provider:  Terence Cano PA-C  11/04/19 4186 Resulting lab:  [facility]    Specimen Information    Type Source Collected On   Blood — 11/05/19 3761          Components    Component Value Reference Range Flag Lab   WBC 7.9 4.0 - 11.0 x10(3) working c PT, leonardo . SPB 110s. Told about IS. Pt saw Phoebe Becker MD 10/24 and I reviewed the note. Pt reports having defibrillator, follows c Dr. Carlin Guaman and Dr. Nba Hdz IV for COPD, prior tob, quit 2014. Has INDRA. Saw 8/13/19, reviewed. • HYSTERECTOMY  6/26/2012    erin S & O   • INTRAOPERATIVE NEURO MONITORING N/A 11/18/2014    Performed by Chelsea Boyce MD at [place] [address] N/A 7/3/2014    Performed by Arnav Khoury DO at [address] [place] [address] Tobacco comment: smokes e cigarette now, quiting/at least 30 pack hx per md note    Alcohol use: Not Currently      Alcohol/week: 0.0 standard drinks      Comment: no alcohol for 14 years       Fam Hx  Family History   Adopted: Yes   Problem Relation A CONCLUSION:  The images obtained and the fluoroscopy which was provided was for planning purposes at the time of the procedure.      Dictated by: Drew Cardenas MD on 11/04/2019 at 12:55     Approved by: Drew Cardenas MD on 11/04/2019 at 12:56 Electronically signed by Celeste Ferrara MD on 11/4/2019  4:11 PM   Attribution Key    CY. 1 - Celeste Ferrara MD on 11/4/2019  3:56 PM                     D/C Summary    No notes of this type exist for this encounter.         Physical Therapy Note • Major depressive disorder 4/3/2013   • Osteoarthritis     right hip bone spurs   • Osteopenia    • Other and unspecified hyperlipidemia    • Pap smear for cervical cancer screening 6/2011    wnl   • PONV (postoperative nausea and vomiting)    • Seizure d SUBJECTIVE  \"I was having trouble at the grocery store.  I couldn't wait to get back to the car because of the pain\"     Patient’s self-stated goal is to walk without pain    OBJECTIVE  Precautions: MARIO - anterior    WEIGHT BEARING RESTRICTION  Weight Rain RW and CGA to supervision c cues for WBAT, proper integration of RW and reciprocal gait pattern. Pt able to recall 1/3 hip precautions. Educated pt on surgical precautions, pt verbalizes understanding.      PM: Pt performed seated and standing therex per T and this patient is demonstrating a 46.58% degree of impairment in mobility. Research supports that patients with this level of impairment may benefit from home c HHPT.       DISCHARGE RECOMMENDATIONS  PT Discharge Recommendations: Home with home health PT History related to current admission: Pt was admitted from home on 11/4/2019 with MARIO. Pt has a past medical history significant for OA, spinal fusion. See below for detailed past medical/surigcal history.         Problem List[AE.1]  Active Problems:    * N • COLPOSCOPY, CERVIX W/UPPER ADJACENT VAGINA; W/BIOPSY(S), CERVIX  1985   • CRYOCAUTERY OF CERVIX  1985    CECE   • HYSTERECTOMY  6/26/2012    erin S & O   • INTRAOPERATIVE NEURO MONITORING N/A 11/18/2014    Performed by General Pauly MD at [address] [place] [address]  Lower extremity ROM is within functional limits except for R hip, consistent with MARIO this date. Lower extremity strength is within functional limits except for R hip, consistent with MARIO this date.     BALANCE[AE.1]  Static Sitting: Good  Dynamic Sittin for hand and RLE placement. Ambulation x10ft to chair with min A. Noted multiple minor LOB posterior during gait 2/2 ongoing c/o's pain.     Exercise/Education Provided:  Bed mobility  Body mechanics  Functional activity tolerated  Gait training  Transfer Patient is able to ambulate 150 feet with assistive device at assistance level: modified independent    Goal #4    Patient will negotiate 4 stairs/one curb w/ assistive device and supervision   Goal #5    Patient verbalizes and/or demonstrates all precau • COPD (chronic obstructive pulmonary disease) (HCC)     No O2   • Depression    • Extrinsic asthma, unspecified     moderate persistent   • Fibrocystic breast disease    • Fibroids    • Ganglion, unspecified 3/31/2014   • H/O mammogram 5-20-14    benign dissection, benign nodule   • SI JOINT INJECTION Bilateral 6/5/2014    Performed by Janel Parks DO at [facility] UNLISTED      Lumbar Spinal Fusion   • THORACOTOMY Right 1/17/2019    Performed by Dylan Escobar -   Putting on and taking off regular lower body clothing?: A Little   -   Bathing (including washing, rinsing, drying)?: A Little  -   Toileting, which includes using toilet, bedpan or urinal? : A Little  -   Putting on and taking off regular upper body c Patient is a 64year old female admitted on 11/4/2019 for[BJ. 1] R MARIO[BJ.2]. Complete medical history and occupational profile noted above. Functional outcome measures completed include[BJ. 1] AMPA with score of 21 indicating pt is a good home candidate de SLP Notes    No notes of this type exist for this encounter.      Immunizations     Name Date      Depo-Medrol 80mg, Im Inj 10/29/14     INFLUENZA 10/24/19     INFLUENZA 10/23/18     INFLUENZA 10/24/17     INFLUENZA 12/01/16     INFLUENZA 12/28/15     INFLU

## (undated) NOTE — LETTER
[address] [facility] FOR BLOOD OR BLOOD COMPONENTS      In the course of your treatment, it may become necessary to administer a transfusion of blood or blood components.  This form provides basic information concerning this proc explain the alternatives to you if it has not already been done. I,Lianne Sierra, have read/had read to me the above. I understand the matters bearing on the decision whether or not to authorize a transfusion of blood or blood components.  I have no qu

## (undated) NOTE — LETTER
[facility] [address], [address]  Consent for Procedure/Sedation    Date:     Time:       1.  I authorize the performance upon Hughie Primrose the following:  Single chamber internal cardiac defibrillator generator change and/or Printed: 10/8/2020   10:44 AM  Patient Name: Johanna Ordoñez        : 11/3/1960       Medical Record #: [id number]